# Patient Record
Sex: MALE | Race: WHITE | NOT HISPANIC OR LATINO | URBAN - METROPOLITAN AREA
[De-identification: names, ages, dates, MRNs, and addresses within clinical notes are randomized per-mention and may not be internally consistent; named-entity substitution may affect disease eponyms.]

---

## 2017-04-12 ENCOUNTER — ON CAMPUS - OUTPATIENT (AMBULATORY)
Dept: URBAN - METROPOLITAN AREA HOSPITAL 2 | Facility: HOSPITAL | Age: 62
End: 2017-04-12
Payer: COMMERCIAL

## 2017-04-12 VITALS
HEART RATE: 65 BPM | DIASTOLIC BLOOD PRESSURE: 59 MMHG | HEART RATE: 60 BPM | SYSTOLIC BLOOD PRESSURE: 153 MMHG | SYSTOLIC BLOOD PRESSURE: 130 MMHG | OXYGEN SATURATION: 93 % | DIASTOLIC BLOOD PRESSURE: 70 MMHG | DIASTOLIC BLOOD PRESSURE: 73 MMHG | TEMPERATURE: 98 F | WEIGHT: 173 LBS | OXYGEN SATURATION: 96 % | SYSTOLIC BLOOD PRESSURE: 150 MMHG | RESPIRATION RATE: 24 BRPM | DIASTOLIC BLOOD PRESSURE: 98 MMHG | OXYGEN SATURATION: 98 % | OXYGEN SATURATION: 99 % | HEART RATE: 62 BPM | HEART RATE: 73 BPM | DIASTOLIC BLOOD PRESSURE: 58 MMHG | RESPIRATION RATE: 18 BRPM | SYSTOLIC BLOOD PRESSURE: 106 MMHG | SYSTOLIC BLOOD PRESSURE: 115 MMHG | DIASTOLIC BLOOD PRESSURE: 78 MMHG | OXYGEN SATURATION: 100 % | HEIGHT: 63 IN | SYSTOLIC BLOOD PRESSURE: 123 MMHG | HEART RATE: 81 BPM

## 2017-04-12 DIAGNOSIS — K44.9 DIAPHRAGMATIC HERNIA WITHOUT OBSTRUCTION OR GANGRENE: ICD-10-CM

## 2017-04-12 DIAGNOSIS — R13.10 DYSPHAGIA, UNSPECIFIED: ICD-10-CM

## 2017-04-12 DIAGNOSIS — K21.9 GASTRO-ESOPHAGEAL REFLUX DISEASE WITHOUT ESOPHAGITIS: ICD-10-CM

## 2017-04-12 PROCEDURE — 43235 EGD DIAGNOSTIC BRUSH WASH: CPT | Performed by: INTERNAL MEDICINE

## 2017-04-12 PROCEDURE — 43450 DILATE ESOPHAGUS 1/MULT PASS: CPT | Performed by: INTERNAL MEDICINE

## 2017-04-12 RX ORDER — OMEPRAZOLE 40 MG/1
40 CAPSULE, DELAYED RELEASE ORAL
Qty: 90 | Refills: 5 | Status: COMPLETED
End: 2017-07-21

## 2017-04-12 RX ORDER — MESALAMINE 1.2 G/1
TABLET, DELAYED RELEASE ORAL
Qty: 360 | Refills: 11 | Status: COMPLETED
Start: 2017-04-12 | End: 2017-07-21

## 2017-04-12 RX ORDER — METRONIDAZOLE 500 MG/1
TABLET, FILM COATED ORAL
Qty: 40 | Refills: 5 | Status: COMPLETED
Start: 2017-04-12 | End: 2017-07-21

## 2017-04-12 RX ORDER — PREDNISONE 10 MG/1
TABLET ORAL
Qty: 105 | Refills: 1 | Status: COMPLETED
Start: 2017-04-12 | End: 2017-07-21

## 2017-04-12 RX ADMIN — PROPOFOL: 10 INJECTION, EMULSION INTRAVENOUS at 09:46

## 2017-06-01 ENCOUNTER — OFFICE (AMBULATORY)
Dept: URBAN - METROPOLITAN AREA CLINIC 64 | Facility: CLINIC | Age: 62
End: 2017-06-01

## 2017-06-01 VITALS
WEIGHT: 170 LBS | DIASTOLIC BLOOD PRESSURE: 74 MMHG | HEIGHT: 63 IN | SYSTOLIC BLOOD PRESSURE: 115 MMHG | HEART RATE: 95 BPM

## 2017-06-01 DIAGNOSIS — K51.019 ULCERATIVE (CHRONIC) PANCOLITIS WITH UNSPECIFIED COMPLICATIO: ICD-10-CM

## 2017-06-01 DIAGNOSIS — K52.9 NONINFECTIVE GASTROENTERITIS AND COLITIS, UNSPECIFIED: ICD-10-CM

## 2017-06-01 DIAGNOSIS — K59.00 CONSTIPATION, UNSPECIFIED: ICD-10-CM

## 2017-06-01 DIAGNOSIS — R10.32 LEFT LOWER QUADRANT PAIN: ICD-10-CM

## 2017-06-01 DIAGNOSIS — Z86.010 PERSONAL HISTORY OF COLONIC POLYPS: ICD-10-CM

## 2017-06-01 DIAGNOSIS — R14.0 ABDOMINAL DISTENSION (GASEOUS): ICD-10-CM

## 2017-06-01 DIAGNOSIS — K44.9 DIAPHRAGMATIC HERNIA WITHOUT OBSTRUCTION OR GANGRENE: ICD-10-CM

## 2017-06-01 PROCEDURE — 99214 OFFICE O/P EST MOD 30 MIN: CPT | Performed by: INTERNAL MEDICINE

## 2017-06-01 RX ORDER — ADALIMUMAB 40MG/0.8ML
KIT SUBCUTANEOUS
Qty: 1 | Refills: 5 | Status: COMPLETED
Start: 2017-06-01 | End: 2017-07-21

## 2017-06-01 RX ORDER — METRONIDAZOLE 500 MG/1
TABLET, FILM COATED ORAL
Qty: 40 | Refills: 5 | Status: COMPLETED
Start: 2017-04-12 | End: 2017-07-21

## 2017-06-01 RX ORDER — PREDNISONE 10 MG/1
TABLET ORAL
Qty: 105 | Refills: 1 | Status: COMPLETED
Start: 2017-04-12 | End: 2017-07-21

## 2017-06-01 RX ORDER — OMEPRAZOLE 40 MG/1
40 CAPSULE, DELAYED RELEASE ORAL
Qty: 90 | Refills: 5 | Status: COMPLETED
End: 2017-07-21

## 2017-06-01 RX ORDER — MESALAMINE 1.2 G/1
TABLET, DELAYED RELEASE ORAL
Qty: 360 | Refills: 11 | Status: COMPLETED
Start: 2017-04-12 | End: 2017-07-21

## 2017-06-02 LAB
CBC WITH DIFFERENTIAL/PLATELET: BASO (ABSOLUTE): 0 X10E3/UL (ref 0–0.2)
CBC WITH DIFFERENTIAL/PLATELET: BASOS: 1 %
CBC WITH DIFFERENTIAL/PLATELET: EOS (ABSOLUTE): 0 X10E3/UL (ref 0–0.4)
CBC WITH DIFFERENTIAL/PLATELET: EOS: 0 %
CBC WITH DIFFERENTIAL/PLATELET: HEMATOCRIT: 43.5 % (ref 37.5–51)
CBC WITH DIFFERENTIAL/PLATELET: HEMATOLOGY COMMENTS: (no result)
CBC WITH DIFFERENTIAL/PLATELET: HEMOGLOBIN: 14.6 G/DL (ref 12.6–17.7)
CBC WITH DIFFERENTIAL/PLATELET: IMMATURE CELLS: (no result)
CBC WITH DIFFERENTIAL/PLATELET: IMMATURE GRANS (ABS): 0 X10E3/UL (ref 0–0.1)
CBC WITH DIFFERENTIAL/PLATELET: IMMATURE GRANULOCYTES: 0 %
CBC WITH DIFFERENTIAL/PLATELET: LYMPHS (ABSOLUTE): 0.5 X10E3/UL — LOW (ref 0.7–3.1)
CBC WITH DIFFERENTIAL/PLATELET: LYMPHS: 21 %
CBC WITH DIFFERENTIAL/PLATELET: MCH: 30 PG (ref 26.6–33)
CBC WITH DIFFERENTIAL/PLATELET: MCHC: 33.6 G/DL (ref 31.5–35.7)
CBC WITH DIFFERENTIAL/PLATELET: MCV: 90 FL (ref 79–97)
CBC WITH DIFFERENTIAL/PLATELET: MONOCYTES(ABSOLUTE): 0.2 X10E3/UL (ref 0.1–0.9)
CBC WITH DIFFERENTIAL/PLATELET: MONOCYTES: 9 %
CBC WITH DIFFERENTIAL/PLATELET: NEUTROPHILS (ABSOLUTE): 1.7 X10E3/UL (ref 1.4–7)
CBC WITH DIFFERENTIAL/PLATELET: NEUTROPHILS: 69 %
CBC WITH DIFFERENTIAL/PLATELET: NRBC: (no result)
CBC WITH DIFFERENTIAL/PLATELET: PLATELETS: 196 X10E3/UL (ref 150–379)
CBC WITH DIFFERENTIAL/PLATELET: RBC: 4.86 X10E6/UL (ref 4.14–5.8)
CBC WITH DIFFERENTIAL/PLATELET: RDW: 14.1 % (ref 12.3–15.4)
CBC WITH DIFFERENTIAL/PLATELET: WBC: 2.5 X10E3/UL — CRITICAL LOW (ref 3.4–10.8)
COMP. METABOLIC PANEL (14): A/G RATIO: 1.7 (ref 1.2–2.2)
COMP. METABOLIC PANEL (14): ALBUMIN, SERUM: 4.5 G/DL (ref 3.6–4.8)
COMP. METABOLIC PANEL (14): ALKALINE PHOSPHATASE, S: 85 IU/L (ref 39–117)
COMP. METABOLIC PANEL (14): ALT (SGPT): 35 IU/L (ref 0–44)
COMP. METABOLIC PANEL (14): AST (SGOT): 35 IU/L (ref 0–40)
COMP. METABOLIC PANEL (14): BILIRUBIN, TOTAL: 0.3 MG/DL (ref 0–1.2)
COMP. METABOLIC PANEL (14): BUN/CREATININE RATIO: 13 (ref 10–24)
COMP. METABOLIC PANEL (14): BUN: 15 MG/DL (ref 8–27)
COMP. METABOLIC PANEL (14): CALCIUM, SERUM: 9.7 MG/DL (ref 8.6–10.2)
COMP. METABOLIC PANEL (14): CARBON DIOXIDE, TOTAL: 22 MMOL/L (ref 18–29)
COMP. METABOLIC PANEL (14): CHLORIDE, SERUM: 94 MMOL/L — LOW (ref 96–106)
COMP. METABOLIC PANEL (14): CREATININE, SERUM: 1.12 MG/DL (ref 0.76–1.27)
COMP. METABOLIC PANEL (14): EGFR IF AFRICN AM: 80 ML/MIN/1.73 (ref 59–?)
COMP. METABOLIC PANEL (14): EGFR IF NONAFRICN AM: 70 ML/MIN/1.73 (ref 59–?)
COMP. METABOLIC PANEL (14): GLOBULIN, TOTAL: 2.6 G/DL (ref 1.5–4.5)
COMP. METABOLIC PANEL (14): GLUCOSE, SERUM: 97 MG/DL (ref 65–99)
COMP. METABOLIC PANEL (14): POTASSIUM, SERUM: 9.5 MMOL/L (ref 3.5–5.2)
COMP. METABOLIC PANEL (14): PROTEIN, TOTAL, SERUM: 7.1 G/DL (ref 6–8.5)
COMP. METABOLIC PANEL (14): SODIUM, SERUM: 132 MMOL/L — LOW (ref 134–144)
HBSAG SCREEN: NEGATIVE
HEP B CORE AB, TOT: NEGATIVE
QUANTIFERON IN TUBE: INTERPRETATION: (no result)
QUANTIFERON IN TUBE: QFT TB AG MINUS NIL VALUE: 0.03 IU/ML
QUANTIFERON IN TUBE: QUANTIFERON CRITERIA: (no result)
QUANTIFERON IN TUBE: QUANTIFERON MITOGEN VALUE: 3.34 IU/ML
QUANTIFERON IN TUBE: QUANTIFERON NIL VALUE: 0.49 IU/ML
QUANTIFERON IN TUBE: QUANTIFERON TB AG VALUE: 0.52 IU/ML
QUANTIFERON IN TUBE: QUANTIFERON TB GOLD: NEGATIVE
QUANTIFERON TB GOLD (IN TUBE): QUANTIFERON INCUBATION: (no result)

## 2017-06-07 ENCOUNTER — INPATIENT HOSPITAL (AMBULATORY)
Dept: URBAN - METROPOLITAN AREA HOSPITAL 84 | Facility: HOSPITAL | Age: 62
End: 2017-06-07
Payer: COMMERCIAL

## 2017-06-07 DIAGNOSIS — R10.9 UNSPECIFIED ABDOMINAL PAIN: ICD-10-CM

## 2017-06-07 DIAGNOSIS — K58.1 IRRITABLE BOWEL SYNDROME WITH CONSTIPATION: ICD-10-CM

## 2017-06-07 PROCEDURE — 99222 1ST HOSP IP/OBS MODERATE 55: CPT | Performed by: NURSE PRACTITIONER

## 2017-07-21 ENCOUNTER — OFFICE (AMBULATORY)
Dept: URBAN - METROPOLITAN AREA CLINIC 64 | Facility: CLINIC | Age: 62
End: 2017-07-21

## 2017-07-21 VITALS
HEIGHT: 63 IN | DIASTOLIC BLOOD PRESSURE: 68 MMHG | SYSTOLIC BLOOD PRESSURE: 131 MMHG | HEART RATE: 70 BPM | WEIGHT: 176 LBS

## 2017-07-21 DIAGNOSIS — K44.9 DIAPHRAGMATIC HERNIA WITHOUT OBSTRUCTION OR GANGRENE: ICD-10-CM

## 2017-07-21 DIAGNOSIS — Z86.010 PERSONAL HISTORY OF COLONIC POLYPS: ICD-10-CM

## 2017-07-21 DIAGNOSIS — M06.9 RHEUMATOID ARTHRITIS, UNSPECIFIED: ICD-10-CM

## 2017-07-21 DIAGNOSIS — K51.019 ULCERATIVE (CHRONIC) PANCOLITIS WITH UNSPECIFIED COMPLICATIO: ICD-10-CM

## 2017-07-21 DIAGNOSIS — K64.8 OTHER HEMORRHOIDS: ICD-10-CM

## 2017-07-21 PROCEDURE — 99213 OFFICE O/P EST LOW 20 MIN: CPT | Performed by: INTERNAL MEDICINE

## 2017-07-21 RX ORDER — ADALIMUMAB 40MG/0.8ML
KIT SUBCUTANEOUS
Qty: 12 | Refills: 5 | Status: ACTIVE
Start: 2017-07-21

## 2017-07-21 RX ORDER — MESALAMINE 1.2 G/1
TABLET, DELAYED RELEASE ORAL
Qty: 180 | Refills: 11 | Status: COMPLETED
End: 2018-12-20

## 2017-07-21 RX ORDER — HYDROCORTISONE 25 MG/G
OINTMENT TOPICAL
Qty: 30 | Refills: 6 | Status: ACTIVE
Start: 2017-07-21

## 2017-07-21 RX ORDER — TRAZODONE HYDROCHLORIDE 50 MG/1
TABLET, FILM COATED ORAL
Qty: 90 | Refills: 5 | Status: COMPLETED
End: 2020-04-14

## 2017-10-10 ENCOUNTER — OFFICE (AMBULATORY)
Dept: URBAN - METROPOLITAN AREA CLINIC 64 | Facility: CLINIC | Age: 62
End: 2017-10-10

## 2017-10-10 VITALS — DIASTOLIC BLOOD PRESSURE: 71 MMHG | SYSTOLIC BLOOD PRESSURE: 129 MMHG | HEIGHT: 63 IN | HEART RATE: 68 BPM

## 2017-10-10 DIAGNOSIS — Z86.010 PERSONAL HISTORY OF COLONIC POLYPS: ICD-10-CM

## 2017-10-10 DIAGNOSIS — M06.9 RHEUMATOID ARTHRITIS, UNSPECIFIED: ICD-10-CM

## 2017-10-10 DIAGNOSIS — K51.019 ULCERATIVE (CHRONIC) PANCOLITIS WITH UNSPECIFIED COMPLICATIO: ICD-10-CM

## 2017-10-10 DIAGNOSIS — K21.9 GASTRO-ESOPHAGEAL REFLUX DISEASE WITHOUT ESOPHAGITIS: ICD-10-CM

## 2017-10-10 PROCEDURE — 99212 OFFICE O/P EST SF 10 MIN: CPT | Performed by: INTERNAL MEDICINE

## 2017-10-10 RX ORDER — TRAZODONE HYDROCHLORIDE 50 MG/1
TABLET, FILM COATED ORAL
Qty: 90 | Refills: 5 | Status: COMPLETED
End: 2020-04-14

## 2017-10-10 RX ORDER — MESALAMINE 1.2 G/1
TABLET, DELAYED RELEASE ORAL
Qty: 180 | Refills: 11 | Status: COMPLETED
End: 2018-12-20

## 2017-10-10 RX ORDER — ADALIMUMAB 40MG/0.8ML
KIT SUBCUTANEOUS
Qty: 12 | Refills: 5 | Status: ACTIVE
Start: 2017-07-21

## 2017-10-10 RX ORDER — HYDROCORTISONE 25 MG/G
OINTMENT TOPICAL
Qty: 30 | Refills: 6 | Status: ACTIVE
Start: 2017-07-21

## 2017-11-30 ENCOUNTER — OFFICE (AMBULATORY)
Dept: URBAN - METROPOLITAN AREA CLINIC 64 | Facility: CLINIC | Age: 62
End: 2017-11-30

## 2017-11-30 VITALS
DIASTOLIC BLOOD PRESSURE: 64 MMHG | SYSTOLIC BLOOD PRESSURE: 117 MMHG | HEART RATE: 70 BPM | WEIGHT: 168 LBS | HEIGHT: 63 IN

## 2017-11-30 DIAGNOSIS — J42 UNSPECIFIED CHRONIC BRONCHITIS: ICD-10-CM

## 2017-11-30 DIAGNOSIS — K52.9 NONINFECTIVE GASTROENTERITIS AND COLITIS, UNSPECIFIED: ICD-10-CM

## 2017-11-30 DIAGNOSIS — R10.32 LEFT LOWER QUADRANT PAIN: ICD-10-CM

## 2017-11-30 DIAGNOSIS — Z86.010 PERSONAL HISTORY OF COLONIC POLYPS: ICD-10-CM

## 2017-11-30 DIAGNOSIS — K51.019 ULCERATIVE (CHRONIC) PANCOLITIS WITH UNSPECIFIED COMPLICATIO: ICD-10-CM

## 2017-11-30 PROCEDURE — 99213 OFFICE O/P EST LOW 20 MIN: CPT | Performed by: INTERNAL MEDICINE

## 2017-11-30 RX ORDER — AZITHROMYCIN DIHYDRATE 250 MG/1
TABLET, FILM COATED ORAL
Qty: 1 | Refills: 0 | Status: ACTIVE
Start: 2017-11-30

## 2017-12-28 ENCOUNTER — ON CAMPUS - OUTPATIENT (AMBULATORY)
Dept: URBAN - METROPOLITAN AREA HOSPITAL 2 | Facility: HOSPITAL | Age: 62
End: 2017-12-28
Payer: COMMERCIAL

## 2017-12-28 VITALS
OXYGEN SATURATION: 96 % | WEIGHT: 170 LBS | DIASTOLIC BLOOD PRESSURE: 69 MMHG | DIASTOLIC BLOOD PRESSURE: 74 MMHG | SYSTOLIC BLOOD PRESSURE: 159 MMHG | OXYGEN SATURATION: 98 % | DIASTOLIC BLOOD PRESSURE: 89 MMHG | DIASTOLIC BLOOD PRESSURE: 86 MMHG | SYSTOLIC BLOOD PRESSURE: 88 MMHG | HEART RATE: 73 BPM | DIASTOLIC BLOOD PRESSURE: 63 MMHG | HEART RATE: 74 BPM | HEIGHT: 63 IN | HEART RATE: 75 BPM | HEART RATE: 72 BPM | OXYGEN SATURATION: 94 % | SYSTOLIC BLOOD PRESSURE: 139 MMHG | TEMPERATURE: 97.5 F | SYSTOLIC BLOOD PRESSURE: 156 MMHG | OXYGEN SATURATION: 99 % | RESPIRATION RATE: 16 BRPM | RESPIRATION RATE: 15 BRPM | HEART RATE: 65 BPM | HEART RATE: 82 BPM | SYSTOLIC BLOOD PRESSURE: 154 MMHG | SYSTOLIC BLOOD PRESSURE: 116 MMHG | DIASTOLIC BLOOD PRESSURE: 77 MMHG | OXYGEN SATURATION: 100 % | SYSTOLIC BLOOD PRESSURE: 152 MMHG | RESPIRATION RATE: 18 BRPM

## 2017-12-28 DIAGNOSIS — K57.30 DIVERTICULOSIS OF LARGE INTESTINE WITHOUT PERFORATION OR ABS: ICD-10-CM

## 2017-12-28 DIAGNOSIS — K64.8 OTHER HEMORRHOIDS: ICD-10-CM

## 2017-12-28 DIAGNOSIS — K51.019 ULCERATIVE (CHRONIC) PANCOLITIS WITH UNSPECIFIED COMPLICATIO: ICD-10-CM

## 2017-12-28 DIAGNOSIS — K62.89 OTHER SPECIFIED DISEASES OF ANUS AND RECTUM: ICD-10-CM

## 2017-12-28 DIAGNOSIS — Z86.010 PERSONAL HISTORY OF COLONIC POLYPS: ICD-10-CM

## 2017-12-28 DIAGNOSIS — R10.32 LEFT LOWER QUADRANT PAIN: ICD-10-CM

## 2017-12-28 PROCEDURE — 45330 DIAGNOSTIC SIGMOIDOSCOPY: CPT | Performed by: INTERNAL MEDICINE

## 2017-12-28 RX ORDER — TRAZODONE HYDROCHLORIDE 50 MG/1
TABLET, FILM COATED ORAL
Qty: 90 | Refills: 5 | Status: COMPLETED
End: 2020-04-14

## 2017-12-28 RX ORDER — MESALAMINE 1.2 G/1
TABLET, DELAYED RELEASE ORAL
Qty: 180 | Refills: 11 | Status: COMPLETED
End: 2018-12-20

## 2017-12-28 RX ORDER — ADALIMUMAB 40MG/0.8ML
KIT SUBCUTANEOUS
Qty: 12 | Refills: 5 | Status: ACTIVE
Start: 2017-07-21

## 2017-12-28 RX ADMIN — PROPOFOL: 10 INJECTION, EMULSION INTRAVENOUS at 08:33

## 2018-04-13 ENCOUNTER — ON CAMPUS - OUTPATIENT (AMBULATORY)
Dept: URBAN - METROPOLITAN AREA HOSPITAL 2 | Facility: HOSPITAL | Age: 63
End: 2018-04-13
Payer: COMMERCIAL

## 2018-04-13 VITALS
OXYGEN SATURATION: 97 % | OXYGEN SATURATION: 99 % | WEIGHT: 170 LBS | SYSTOLIC BLOOD PRESSURE: 97 MMHG | HEART RATE: 67 BPM | SYSTOLIC BLOOD PRESSURE: 131 MMHG | HEART RATE: 80 BPM | HEIGHT: 63 IN | HEART RATE: 82 BPM | DIASTOLIC BLOOD PRESSURE: 80 MMHG | RESPIRATION RATE: 16 BRPM | RESPIRATION RATE: 20 BRPM | RESPIRATION RATE: 19 BRPM | SYSTOLIC BLOOD PRESSURE: 123 MMHG | TEMPERATURE: 98.6 F | HEART RATE: 77 BPM | OXYGEN SATURATION: 96 % | DIASTOLIC BLOOD PRESSURE: 70 MMHG | OXYGEN SATURATION: 95 % | SYSTOLIC BLOOD PRESSURE: 124 MMHG | DIASTOLIC BLOOD PRESSURE: 52 MMHG | DIASTOLIC BLOOD PRESSURE: 45 MMHG | RESPIRATION RATE: 18 BRPM

## 2018-04-13 DIAGNOSIS — K21.9 GASTRO-ESOPHAGEAL REFLUX DISEASE WITHOUT ESOPHAGITIS: ICD-10-CM

## 2018-04-13 DIAGNOSIS — K44.9 DIAPHRAGMATIC HERNIA WITHOUT OBSTRUCTION OR GANGRENE: ICD-10-CM

## 2018-04-13 DIAGNOSIS — R13.19 OTHER DYSPHAGIA: ICD-10-CM

## 2018-04-13 PROCEDURE — 43450 DILATE ESOPHAGUS 1/MULT PASS: CPT | Performed by: INTERNAL MEDICINE

## 2018-04-13 PROCEDURE — 43235 EGD DIAGNOSTIC BRUSH WASH: CPT | Performed by: INTERNAL MEDICINE

## 2018-04-13 RX ORDER — OMEPRAZOLE 40 MG/1
CAPSULE, DELAYED RELEASE ORAL
Qty: 90 | Refills: 6 | Status: COMPLETED
End: 2018-04-15

## 2018-04-13 RX ADMIN — PROPOFOL: 10 INJECTION, EMULSION INTRAVENOUS at 13:25

## 2018-05-11 ENCOUNTER — OFFICE (AMBULATORY)
Dept: URBAN - METROPOLITAN AREA CLINIC 64 | Facility: CLINIC | Age: 63
End: 2018-05-11

## 2018-05-11 VITALS
WEIGHT: 171 LBS | DIASTOLIC BLOOD PRESSURE: 81 MMHG | HEART RATE: 62 BPM | HEIGHT: 63 IN | SYSTOLIC BLOOD PRESSURE: 133 MMHG

## 2018-05-11 DIAGNOSIS — K51.019 ULCERATIVE (CHRONIC) PANCOLITIS WITH UNSPECIFIED COMPLICATIO: ICD-10-CM

## 2018-05-11 DIAGNOSIS — R13.19 OTHER DYSPHAGIA: ICD-10-CM

## 2018-05-11 DIAGNOSIS — K64.8 OTHER HEMORRHOIDS: ICD-10-CM

## 2018-05-11 DIAGNOSIS — Z86.010 PERSONAL HISTORY OF COLONIC POLYPS: ICD-10-CM

## 2018-05-11 PROCEDURE — 99213 OFFICE O/P EST LOW 20 MIN: CPT | Performed by: INTERNAL MEDICINE

## 2018-05-11 RX ORDER — HYDROCORTISONE 25 MG/G
OINTMENT TOPICAL
Qty: 30 | Refills: 6 | Status: COMPLETED
Start: 2017-12-28 | End: 2018-11-02

## 2018-05-11 RX ORDER — MESALAMINE 1.2 G/1
TABLET, DELAYED RELEASE ORAL
Qty: 180 | Refills: 11 | Status: COMPLETED
End: 2018-12-20

## 2018-05-11 RX ORDER — OMEPRAZOLE 20 MG/1
CAPSULE, DELAYED RELEASE ORAL
Qty: 180 | Refills: 3 | Status: ACTIVE
Start: 2018-04-15

## 2018-05-11 RX ORDER — TRAZODONE HYDROCHLORIDE 50 MG/1
TABLET, FILM COATED ORAL
Qty: 90 | Refills: 5 | Status: COMPLETED
End: 2020-04-14

## 2018-05-11 RX ORDER — ADALIMUMAB 40MG/0.8ML
KIT SUBCUTANEOUS
Qty: 12 | Refills: 5 | Status: ACTIVE
Start: 2017-07-21

## 2018-05-11 RX ORDER — CELECOXIB 100 MG/1
CAPSULE ORAL
Qty: 180 | Refills: 10 | Status: COMPLETED
Start: 2017-12-28 | End: 2022-10-11

## 2018-11-02 ENCOUNTER — OFFICE (AMBULATORY)
Dept: URBAN - METROPOLITAN AREA CLINIC 64 | Facility: CLINIC | Age: 63
End: 2018-11-02

## 2018-11-02 VITALS
SYSTOLIC BLOOD PRESSURE: 120 MMHG | HEIGHT: 63 IN | DIASTOLIC BLOOD PRESSURE: 83 MMHG | HEART RATE: 91 BPM | WEIGHT: 179 LBS

## 2018-11-02 DIAGNOSIS — K64.8 OTHER HEMORRHOIDS: ICD-10-CM

## 2018-11-02 DIAGNOSIS — R10.32 LEFT LOWER QUADRANT PAIN: ICD-10-CM

## 2018-11-02 DIAGNOSIS — K51.019 ULCERATIVE (CHRONIC) PANCOLITIS WITH UNSPECIFIED COMPLICATIO: ICD-10-CM

## 2018-11-02 DIAGNOSIS — R13.19 OTHER DYSPHAGIA: ICD-10-CM

## 2018-11-02 DIAGNOSIS — Z86.010 PERSONAL HISTORY OF COLONIC POLYPS: ICD-10-CM

## 2018-11-02 DIAGNOSIS — K44.9 DIAPHRAGMATIC HERNIA WITHOUT OBSTRUCTION OR GANGRENE: ICD-10-CM

## 2018-11-02 PROCEDURE — 99213 OFFICE O/P EST LOW 20 MIN: CPT | Performed by: INTERNAL MEDICINE

## 2018-11-02 RX ORDER — MESALAMINE 1.2 G/1
TABLET, DELAYED RELEASE ORAL
Qty: 180 | Refills: 11 | Status: COMPLETED
End: 2018-12-20

## 2018-11-02 RX ORDER — TRAZODONE HYDROCHLORIDE 50 MG/1
TABLET, FILM COATED ORAL
Qty: 90 | Refills: 5 | Status: COMPLETED
End: 2020-04-14

## 2018-11-02 RX ORDER — OMEPRAZOLE 20 MG/1
CAPSULE, DELAYED RELEASE ORAL
Qty: 180 | Refills: 3 | Status: ACTIVE
Start: 2018-04-15

## 2018-11-02 RX ORDER — ADALIMUMAB 40MG/0.4ML
KIT SUBCUTANEOUS
Qty: 6 | Refills: 5 | Status: ACTIVE
Start: 2018-11-02

## 2018-11-02 RX ORDER — CELECOXIB 100 MG/1
CAPSULE ORAL
Qty: 180 | Refills: 10 | Status: COMPLETED
Start: 2017-12-28 | End: 2022-10-11

## 2018-12-20 ENCOUNTER — ON CAMPUS - OUTPATIENT (AMBULATORY)
Dept: URBAN - METROPOLITAN AREA HOSPITAL 2 | Facility: HOSPITAL | Age: 63
End: 2018-12-20
Payer: COMMERCIAL

## 2018-12-20 VITALS
OXYGEN SATURATION: 100 % | HEART RATE: 91 BPM | HEART RATE: 77 BPM | HEART RATE: 84 BPM | SYSTOLIC BLOOD PRESSURE: 135 MMHG | TEMPERATURE: 97.8 F | OXYGEN SATURATION: 97 % | DIASTOLIC BLOOD PRESSURE: 44 MMHG | RESPIRATION RATE: 18 BRPM | DIASTOLIC BLOOD PRESSURE: 96 MMHG | OXYGEN SATURATION: 96 % | SYSTOLIC BLOOD PRESSURE: 93 MMHG | DIASTOLIC BLOOD PRESSURE: 89 MMHG | RESPIRATION RATE: 16 BRPM | SYSTOLIC BLOOD PRESSURE: 147 MMHG | HEIGHT: 63 IN | SYSTOLIC BLOOD PRESSURE: 118 MMHG | WEIGHT: 170 LBS | DIASTOLIC BLOOD PRESSURE: 81 MMHG | HEART RATE: 78 BPM

## 2018-12-20 DIAGNOSIS — R13.10 DYSPHAGIA, UNSPECIFIED: ICD-10-CM

## 2018-12-20 DIAGNOSIS — R07.89 OTHER CHEST PAIN: ICD-10-CM

## 2018-12-20 DIAGNOSIS — K44.9 DIAPHRAGMATIC HERNIA WITHOUT OBSTRUCTION OR GANGRENE: ICD-10-CM

## 2018-12-20 PROCEDURE — 43450 DILATE ESOPHAGUS 1/MULT PASS: CPT | Performed by: INTERNAL MEDICINE

## 2018-12-20 PROCEDURE — 43235 EGD DIAGNOSTIC BRUSH WASH: CPT | Performed by: INTERNAL MEDICINE

## 2018-12-20 RX ORDER — CELECOXIB 100 MG/1
CAPSULE ORAL
Qty: 180 | Refills: 10 | Status: COMPLETED
Start: 2017-12-28 | End: 2022-10-11

## 2018-12-20 RX ORDER — ADALIMUMAB 40MG/0.4ML
KIT SUBCUTANEOUS
Qty: 6 | Refills: 5 | Status: COMPLETED
Start: 2018-12-20 | End: 2020-03-31

## 2018-12-20 RX ORDER — MESALAMINE 1.2 G/1
TABLET, DELAYED RELEASE ORAL
Qty: 180 | Refills: 11 | Status: COMPLETED
End: 2018-12-20

## 2018-12-20 RX ORDER — OMEPRAZOLE 20 MG/1
CAPSULE, DELAYED RELEASE ORAL
Qty: 180 | Refills: 3 | Status: ACTIVE
Start: 2018-04-15

## 2018-12-20 RX ORDER — TRAZODONE HYDROCHLORIDE 50 MG/1
TABLET, FILM COATED ORAL
Qty: 90 | Refills: 5 | Status: COMPLETED
End: 2020-04-14

## 2019-11-26 ENCOUNTER — ON CAMPUS - OUTPATIENT (AMBULATORY)
Dept: URBAN - METROPOLITAN AREA HOSPITAL 2 | Facility: HOSPITAL | Age: 64
End: 2019-11-26
Payer: COMMERCIAL

## 2019-11-26 VITALS
SYSTOLIC BLOOD PRESSURE: 133 MMHG | HEART RATE: 89 BPM | DIASTOLIC BLOOD PRESSURE: 82 MMHG | OXYGEN SATURATION: 100 % | SYSTOLIC BLOOD PRESSURE: 167 MMHG | DIASTOLIC BLOOD PRESSURE: 70 MMHG | TEMPERATURE: 97.6 F | HEART RATE: 80 BPM | HEIGHT: 63 IN | OXYGEN SATURATION: 97 % | OXYGEN SATURATION: 96 % | HEART RATE: 93 BPM | SYSTOLIC BLOOD PRESSURE: 119 MMHG | DIASTOLIC BLOOD PRESSURE: 79 MMHG | SYSTOLIC BLOOD PRESSURE: 147 MMHG | HEART RATE: 71 BPM | WEIGHT: 176 LBS | RESPIRATION RATE: 16 BRPM | DIASTOLIC BLOOD PRESSURE: 91 MMHG | SYSTOLIC BLOOD PRESSURE: 144 MMHG | HEART RATE: 92 BPM

## 2019-11-26 DIAGNOSIS — K21.9 GASTRO-ESOPHAGEAL REFLUX DISEASE WITHOUT ESOPHAGITIS: ICD-10-CM

## 2019-11-26 DIAGNOSIS — R07.89 OTHER CHEST PAIN: ICD-10-CM

## 2019-11-26 DIAGNOSIS — K44.9 DIAPHRAGMATIC HERNIA WITHOUT OBSTRUCTION OR GANGRENE: ICD-10-CM

## 2019-11-26 DIAGNOSIS — R13.19 OTHER DYSPHAGIA: ICD-10-CM

## 2019-11-26 PROCEDURE — 43235 EGD DIAGNOSTIC BRUSH WASH: CPT | Performed by: INTERNAL MEDICINE

## 2019-11-26 PROCEDURE — 43450 DILATE ESOPHAGUS 1/MULT PASS: CPT | Performed by: INTERNAL MEDICINE

## 2019-11-26 RX ORDER — CELECOXIB 100 MG/1
CAPSULE ORAL
Qty: 180 | Refills: 10 | Status: COMPLETED
Start: 2017-12-28 | End: 2022-10-11

## 2019-11-26 RX ORDER — ADALIMUMAB 40MG/0.4ML
KIT SUBCUTANEOUS
Qty: 6 | Refills: 5 | Status: COMPLETED
Start: 2018-12-20 | End: 2020-03-31

## 2019-11-26 RX ORDER — HYDROCORTISONE 25 MG/G
OINTMENT TOPICAL
Qty: 30 | Refills: 6 | Status: COMPLETED
Start: 2019-10-04 | End: 2020-04-14

## 2019-11-26 RX ORDER — OMEPRAZOLE 20 MG/1
CAPSULE, DELAYED RELEASE ORAL
Qty: 180 | Refills: 3 | Status: ACTIVE
Start: 2018-04-15

## 2020-06-11 ENCOUNTER — ON CAMPUS - OUTPATIENT (AMBULATORY)
Dept: URBAN - METROPOLITAN AREA HOSPITAL 2 | Facility: HOSPITAL | Age: 65
End: 2020-06-11

## 2020-06-11 ENCOUNTER — OFFICE (AMBULATORY)
Dept: URBAN - METROPOLITAN AREA PATHOLOGY 4 | Facility: PATHOLOGY | Age: 65
End: 2020-06-11
Payer: COMMERCIAL

## 2020-06-11 ENCOUNTER — OFFICE (AMBULATORY)
Dept: URBAN - METROPOLITAN AREA PATHOLOGY 4 | Facility: PATHOLOGY | Age: 65
End: 2020-06-11

## 2020-06-11 VITALS
HEIGHT: 63 IN | SYSTOLIC BLOOD PRESSURE: 131 MMHG | DIASTOLIC BLOOD PRESSURE: 86 MMHG | RESPIRATION RATE: 20 BRPM | OXYGEN SATURATION: 98 % | SYSTOLIC BLOOD PRESSURE: 153 MMHG | DIASTOLIC BLOOD PRESSURE: 46 MMHG | DIASTOLIC BLOOD PRESSURE: 84 MMHG | DIASTOLIC BLOOD PRESSURE: 75 MMHG | HEART RATE: 80 BPM | HEART RATE: 76 BPM | DIASTOLIC BLOOD PRESSURE: 80 MMHG | OXYGEN SATURATION: 100 % | HEART RATE: 75 BPM | SYSTOLIC BLOOD PRESSURE: 150 MMHG | HEART RATE: 90 BPM | SYSTOLIC BLOOD PRESSURE: 143 MMHG | RESPIRATION RATE: 16 BRPM | SYSTOLIC BLOOD PRESSURE: 128 MMHG | HEART RATE: 82 BPM | SYSTOLIC BLOOD PRESSURE: 134 MMHG | WEIGHT: 170 LBS | HEART RATE: 74 BPM | TEMPERATURE: 97.8 F

## 2020-06-11 DIAGNOSIS — Z86.010 PERSONAL HISTORY OF COLONIC POLYPS: ICD-10-CM

## 2020-06-11 DIAGNOSIS — K62.1 RECTAL POLYP: ICD-10-CM

## 2020-06-11 DIAGNOSIS — K64.8 OTHER HEMORRHOIDS: ICD-10-CM

## 2020-06-11 DIAGNOSIS — K57.30 DIVERTICULOSIS OF LARGE INTESTINE WITHOUT PERFORATION OR ABS: ICD-10-CM

## 2020-06-11 DIAGNOSIS — K21.9 GASTRO-ESOPHAGEAL REFLUX DISEASE WITHOUT ESOPHAGITIS: ICD-10-CM

## 2020-06-11 DIAGNOSIS — R13.19 OTHER DYSPHAGIA: ICD-10-CM

## 2020-06-11 DIAGNOSIS — K44.9 DIAPHRAGMATIC HERNIA WITHOUT OBSTRUCTION OR GANGRENE: ICD-10-CM

## 2020-06-11 PROBLEM — K63.5 POLYP OF COLON: Status: ACTIVE | Noted: 2020-06-11

## 2020-06-11 LAB
GI HISTOLOGY: A. UNSPECIFIED: (no result)
GI HISTOLOGY: PDF REPORT: (no result)

## 2020-06-11 PROCEDURE — 45380 COLONOSCOPY AND BIOPSY: CPT | Mod: PT | Performed by: INTERNAL MEDICINE

## 2020-06-11 PROCEDURE — 88305 TISSUE EXAM BY PATHOLOGIST: CPT | Mod: 26 | Performed by: INTERNAL MEDICINE

## 2020-06-11 PROCEDURE — 43450 DILATE ESOPHAGUS 1/MULT PASS: CPT | Performed by: INTERNAL MEDICINE

## 2020-06-11 PROCEDURE — 43235 EGD DIAGNOSTIC BRUSH WASH: CPT | Performed by: INTERNAL MEDICINE

## 2020-06-11 RX ORDER — OMEPRAZOLE 20 MG/1
CAPSULE, DELAYED RELEASE ORAL
Qty: 180 | Refills: 3 | Status: ACTIVE
Start: 2018-04-15

## 2020-09-01 ENCOUNTER — OFFICE (AMBULATORY)
Dept: URBAN - METROPOLITAN AREA CLINIC 64 | Facility: CLINIC | Age: 65
End: 2020-09-01

## 2020-09-01 VITALS
SYSTOLIC BLOOD PRESSURE: 135 MMHG | HEIGHT: 63 IN | DIASTOLIC BLOOD PRESSURE: 75 MMHG | HEART RATE: 85 BPM | WEIGHT: 174 LBS

## 2020-09-01 DIAGNOSIS — R07.89 OTHER CHEST PAIN: ICD-10-CM

## 2020-09-01 DIAGNOSIS — R10.32 LEFT LOWER QUADRANT PAIN: ICD-10-CM

## 2020-09-01 DIAGNOSIS — R05 COUGH: ICD-10-CM

## 2020-09-01 DIAGNOSIS — M06.9 RHEUMATOID ARTHRITIS, UNSPECIFIED: ICD-10-CM

## 2020-09-01 DIAGNOSIS — J42 UNSPECIFIED CHRONIC BRONCHITIS: ICD-10-CM

## 2020-09-01 DIAGNOSIS — Z86.010 PERSONAL HISTORY OF COLONIC POLYPS: ICD-10-CM

## 2020-09-01 DIAGNOSIS — K52.9 NONINFECTIVE GASTROENTERITIS AND COLITIS, UNSPECIFIED: ICD-10-CM

## 2020-09-01 PROCEDURE — 99213 OFFICE O/P EST LOW 20 MIN: CPT | Performed by: INTERNAL MEDICINE

## 2020-09-01 RX ORDER — CELECOXIB 100 MG/1
CAPSULE ORAL
Qty: 180 | Refills: 10 | Status: COMPLETED
Start: 2017-12-28 | End: 2022-10-11

## 2020-09-01 RX ORDER — LOSARTAN POTASSIUM 50 MG/1
TABLET, FILM COATED ORAL
Refills: 0 | Status: COMPLETED
End: 2020-09-01

## 2020-09-01 RX ORDER — ADALIMUMAB 40MG/0.4ML
KIT SUBCUTANEOUS
Qty: 3 | Refills: 1 | Status: COMPLETED
Start: 2020-03-31 | End: 2022-10-11

## 2020-09-01 RX ORDER — OMEPRAZOLE 20 MG/1
40 CAPSULE, DELAYED RELEASE ORAL
Qty: 180 | Refills: 11 | Status: ACTIVE
Start: 2020-09-01

## 2020-09-01 RX ORDER — METOPROLOL SUCCINATE 100 MG/1
TABLET, EXTENDED RELEASE ORAL
Qty: 90 | Refills: 5 | Status: ACTIVE
Start: 2020-09-01

## 2020-09-01 RX ORDER — METOPROLOL SUCCINATE 100 MG/1
TABLET, EXTENDED RELEASE ORAL
Qty: 90 | Refills: 5 | Status: COMPLETED
Start: 2020-09-01 | End: 2022-04-20

## 2020-10-14 ENCOUNTER — LAB (OUTPATIENT)
Dept: LAB | Facility: HOSPITAL | Age: 65
End: 2020-10-14

## 2020-10-14 ENCOUNTER — TRANSCRIBE ORDERS (OUTPATIENT)
Dept: ADMINISTRATIVE | Facility: HOSPITAL | Age: 65
End: 2020-10-14

## 2020-10-14 DIAGNOSIS — Z01.818 PRE-OP TESTING: ICD-10-CM

## 2020-10-14 DIAGNOSIS — R05.9 COUGH: ICD-10-CM

## 2020-10-14 DIAGNOSIS — R05.9 COUGH: Primary | ICD-10-CM

## 2020-10-14 LAB
APTT PPP: 25.1 SECONDS (ref 24–31)
BASOPHILS # BLD AUTO: 0.03 10*3/MM3 (ref 0–0.2)
BASOPHILS NFR BLD AUTO: 0.5 % (ref 0–1.5)
DEPRECATED RDW RBC AUTO: 40.2 FL (ref 37–54)
EOSINOPHIL # BLD AUTO: 0.12 10*3/MM3 (ref 0–0.4)
EOSINOPHIL NFR BLD AUTO: 1.9 % (ref 0.3–6.2)
ERYTHROCYTE [DISTWIDTH] IN BLOOD BY AUTOMATED COUNT: 13 % (ref 12.3–15.4)
HCT VFR BLD AUTO: 40.9 % (ref 37.5–51)
HGB BLD-MCNC: 14.1 G/DL (ref 13–17.7)
IMM GRANULOCYTES # BLD AUTO: 0.02 10*3/MM3 (ref 0–0.05)
IMM GRANULOCYTES NFR BLD AUTO: 0.3 % (ref 0–0.5)
INR PPP: 0.98 (ref 0.93–1.1)
LYMPHOCYTES # BLD AUTO: 2.41 10*3/MM3 (ref 0.7–3.1)
LYMPHOCYTES NFR BLD AUTO: 38.8 % (ref 19.6–45.3)
MCH RBC QN AUTO: 29.6 PG (ref 26.6–33)
MCHC RBC AUTO-ENTMCNC: 34.5 G/DL (ref 31.5–35.7)
MCV RBC AUTO: 85.9 FL (ref 79–97)
MONOCYTES # BLD AUTO: 0.67 10*3/MM3 (ref 0.1–0.9)
MONOCYTES NFR BLD AUTO: 10.8 % (ref 5–12)
NEUTROPHILS NFR BLD AUTO: 2.96 10*3/MM3 (ref 1.7–7)
NEUTROPHILS NFR BLD AUTO: 47.7 % (ref 42.7–76)
NRBC BLD AUTO-RTO: 0 /100 WBC (ref 0–0.2)
PLATELET # BLD AUTO: 239 10*3/MM3 (ref 140–450)
PMV BLD AUTO: 9.6 FL (ref 6–12)
PROTHROMBIN TIME: 10.8 SECONDS (ref 9.6–11.7)
RBC # BLD AUTO: 4.76 10*6/MM3 (ref 4.14–5.8)
WBC # BLD AUTO: 6.21 10*3/MM3 (ref 3.4–10.8)

## 2020-10-14 PROCEDURE — C9803 HOPD COVID-19 SPEC COLLECT: HCPCS

## 2020-10-14 PROCEDURE — 85025 COMPLETE CBC W/AUTO DIFF WBC: CPT

## 2020-10-14 PROCEDURE — U0004 COV-19 TEST NON-CDC HGH THRU: HCPCS

## 2020-10-14 PROCEDURE — 36415 COLL VENOUS BLD VENIPUNCTURE: CPT

## 2020-10-14 PROCEDURE — 85610 PROTHROMBIN TIME: CPT

## 2020-10-14 PROCEDURE — 85730 THROMBOPLASTIN TIME PARTIAL: CPT

## 2020-10-14 RX ORDER — CELECOXIB 100 MG/1
100 CAPSULE ORAL 2 TIMES DAILY
COMMUNITY

## 2020-10-14 RX ORDER — TRAZODONE HYDROCHLORIDE 50 MG/1
50 TABLET ORAL NIGHTLY
COMMUNITY

## 2020-10-14 RX ORDER — OMEPRAZOLE 20 MG/1
40 CAPSULE, DELAYED RELEASE ORAL DAILY
COMMUNITY

## 2020-10-14 RX ORDER — ATORVASTATIN CALCIUM 20 MG/1
20 TABLET, FILM COATED ORAL NIGHTLY
COMMUNITY

## 2020-10-14 RX ORDER — MONTELUKAST SODIUM 10 MG/1
10 TABLET ORAL NIGHTLY
COMMUNITY

## 2020-10-14 RX ORDER — CYCLOBENZAPRINE HCL 10 MG
10 TABLET ORAL 2 TIMES DAILY PRN
COMMUNITY

## 2020-10-14 RX ORDER — TAMSULOSIN HYDROCHLORIDE 0.4 MG/1
2 CAPSULE ORAL
COMMUNITY

## 2020-10-14 RX ORDER — METOPROLOL SUCCINATE 100 MG/1
100 TABLET, EXTENDED RELEASE ORAL DAILY
COMMUNITY

## 2020-10-15 ENCOUNTER — ANESTHESIA EVENT (OUTPATIENT)
Dept: GASTROENTEROLOGY | Facility: HOSPITAL | Age: 65
End: 2020-10-15

## 2020-10-15 LAB — SARS-COV-2 RNA RESP QL NAA+PROBE: NOT DETECTED

## 2020-10-15 RX ORDER — SODIUM CHLORIDE 9 MG/ML
9 INJECTION, SOLUTION INTRAVENOUS CONTINUOUS PRN
Status: CANCELLED | OUTPATIENT
Start: 2020-10-15

## 2020-10-15 RX ORDER — SODIUM CHLORIDE 0.9 % (FLUSH) 0.9 %
10 SYRINGE (ML) INJECTION EVERY 12 HOURS SCHEDULED
Status: CANCELLED | OUTPATIENT
Start: 2020-10-15

## 2020-10-15 RX ORDER — SODIUM CHLORIDE 0.9 % (FLUSH) 0.9 %
10 SYRINGE (ML) INJECTION AS NEEDED
Status: CANCELLED | OUTPATIENT
Start: 2020-10-15

## 2020-10-16 ENCOUNTER — HOSPITAL ENCOUNTER (OUTPATIENT)
Facility: HOSPITAL | Age: 65
Setting detail: HOSPITAL OUTPATIENT SURGERY
Discharge: HOME | End: 2020-10-16
Attending: INTERNAL MEDICINE | Admitting: INTERNAL MEDICINE

## 2020-10-16 ENCOUNTER — ANESTHESIA (OUTPATIENT)
Dept: GASTROENTEROLOGY | Facility: HOSPITAL | Age: 65
End: 2020-10-16

## 2020-10-16 VITALS
HEIGHT: 63 IN | WEIGHT: 185.19 LBS | OXYGEN SATURATION: 97 % | DIASTOLIC BLOOD PRESSURE: 60 MMHG | TEMPERATURE: 97.7 F | RESPIRATION RATE: 16 BRPM | BODY MASS INDEX: 32.81 KG/M2 | HEART RATE: 74 BPM | SYSTOLIC BLOOD PRESSURE: 148 MMHG

## 2020-10-16 DIAGNOSIS — R05.3 CHRONIC COUGH: ICD-10-CM

## 2020-10-16 LAB

## 2020-10-16 PROCEDURE — 0100U HC BIOFIRE FILMARRAY RESP PANEL 2: CPT | Performed by: INTERNAL MEDICINE

## 2020-10-16 PROCEDURE — 87205 SMEAR GRAM STAIN: CPT | Performed by: INTERNAL MEDICINE

## 2020-10-16 PROCEDURE — 87798 DETECT AGENT NOS DNA AMP: CPT | Performed by: INTERNAL MEDICINE

## 2020-10-16 PROCEDURE — 87102 FUNGUS ISOLATION CULTURE: CPT | Performed by: INTERNAL MEDICINE

## 2020-10-16 PROCEDURE — 87116 MYCOBACTERIA CULTURE: CPT | Performed by: INTERNAL MEDICINE

## 2020-10-16 PROCEDURE — 87070 CULTURE OTHR SPECIMN AEROBIC: CPT | Performed by: INTERNAL MEDICINE

## 2020-10-16 PROCEDURE — 88108 CYTOPATH CONCENTRATE TECH: CPT | Performed by: INTERNAL MEDICINE

## 2020-10-16 PROCEDURE — 87496 CYTOMEG DNA AMP PROBE: CPT | Performed by: INTERNAL MEDICINE

## 2020-10-16 PROCEDURE — 25010000002 PROPOFOL 10 MG/ML EMULSION: Performed by: NURSE ANESTHETIST, CERTIFIED REGISTERED

## 2020-10-16 PROCEDURE — 87206 SMEAR FLUORESCENT/ACID STAI: CPT | Performed by: INTERNAL MEDICINE

## 2020-10-16 RX ORDER — SODIUM CHLORIDE 9 MG/ML
INJECTION, SOLUTION INTRAVENOUS CONTINUOUS PRN
Status: DISCONTINUED | OUTPATIENT
Start: 2020-10-16 | End: 2020-10-16 | Stop reason: SURG

## 2020-10-16 RX ORDER — PROPOFOL 10 MG/ML
VIAL (ML) INTRAVENOUS AS NEEDED
Status: DISCONTINUED | OUTPATIENT
Start: 2020-10-16 | End: 2020-10-16 | Stop reason: SURG

## 2020-10-16 RX ORDER — LIDOCAINE HYDROCHLORIDE 20 MG/ML
INJECTION, SOLUTION INFILTRATION; PERINEURAL AS NEEDED
Status: DISCONTINUED | OUTPATIENT
Start: 2020-10-16 | End: 2020-10-16 | Stop reason: HOSPADM

## 2020-10-16 RX ORDER — LIDOCAINE HYDROCHLORIDE 20 MG/ML
INJECTION, SOLUTION INTRAVENOUS
Status: DISCONTINUED
Start: 2020-10-16 | End: 2020-10-16 | Stop reason: HOSPADM

## 2020-10-16 RX ORDER — LIDOCAINE HYDROCHLORIDE 10 MG/ML
INJECTION, SOLUTION EPIDURAL; INFILTRATION; INTRACAUDAL; PERINEURAL AS NEEDED
Status: DISCONTINUED | OUTPATIENT
Start: 2020-10-16 | End: 2020-10-16 | Stop reason: SURG

## 2020-10-16 RX ORDER — IPRATROPIUM BROMIDE AND ALBUTEROL SULFATE 2.5; .5 MG/3ML; MG/3ML
3 SOLUTION RESPIRATORY (INHALATION) ONCE
Status: COMPLETED | OUTPATIENT
Start: 2020-10-16 | End: 2020-10-16

## 2020-10-16 RX ORDER — LIDOCAINE 50 MG/G
OINTMENT TOPICAL AS NEEDED
Status: DISCONTINUED | OUTPATIENT
Start: 2020-10-16 | End: 2020-10-16 | Stop reason: HOSPADM

## 2020-10-16 RX ADMIN — SODIUM CHLORIDE: 0.9 INJECTION, SOLUTION INTRAVENOUS at 10:05

## 2020-10-16 RX ADMIN — PROPOFOL 30 MG: 10 INJECTION, EMULSION INTRAVENOUS at 10:19

## 2020-10-16 RX ADMIN — PROPOFOL 100 MG: 10 INJECTION, EMULSION INTRAVENOUS at 10:17

## 2020-10-16 RX ADMIN — LIDOCAINE HYDROCHLORIDE 100 MG: 10 INJECTION, SOLUTION EPIDURAL; INFILTRATION; INTRACAUDAL; PERINEURAL at 10:17

## 2020-10-16 RX ADMIN — PROPOFOL 40 MG: 10 INJECTION, EMULSION INTRAVENOUS at 10:18

## 2020-10-16 RX ADMIN — IPRATROPIUM BROMIDE AND ALBUTEROL SULFATE 3 ML: .5; 3 SOLUTION RESPIRATORY (INHALATION) at 10:49

## 2020-10-16 NOTE — ANESTHESIA POSTPROCEDURE EVALUATION
Patient: Roland Abbott    Procedure Summary     Date: 10/16/20 Room / Location: [facility] ENDOSCOPY 2 / [facility] ENDOSCOPY    Anesthesia Start: 1005 Anesthesia Stop: 1025    Procedure: BRONCHOSCOPY (N/A Bronchus) Diagnosis:       Chronic cough      (Chronic cough [R05])    Surgeon: Annetta Fritz MD Provider: Jerry Drake MD    Anesthesia Type: general ASA Status: 2          Anesthesia Type: general    Vitals  Vitals Value Taken Time   /97 10/16/20 1034   Temp     Pulse 75 10/16/20 1037   Resp 16 10/16/20 1029   SpO2 95 % 10/16/20 1037   Vitals shown include unvalidated device data.        Post Anesthesia Care and Evaluation    Patient location during evaluation: PACU  Patient participation: complete - patient participated  Level of consciousness: awake  Pain scale: See nurse's notes for pain score.  Pain management: adequate  Airway patency: patent  Anesthetic complications: No anesthetic complications  PONV Status: none  Cardiovascular status: acceptable  Respiratory status: acceptable  Hydration status: acceptable    Comments: Patient seen and examined postoperatively; vital signs stable; SpO2 greater than or equal to 90%; cardiopulmonary status stable; nausea/vomiting adequately controlled; pain adequately controlled; no apparent anesthesia complications; patient discharged from anesthesia care when discharge criteria were met

## 2020-10-16 NOTE — ANESTHESIA PREPROCEDURE EVALUATION
Anesthesia Evaluation     Patient summary reviewed   NPO Solid Status: > 8 hours  NPO Liquid Status: > 8 hours           Airway   Mallampati: II  TM distance: >3 FB  Neck ROM: full  No difficulty expected  Dental - normal exam     Pulmonary - normal exam   (+) sleep apnea on CPAP,   Cardiovascular - normal exam    (+) hypertension, hyperlipidemia,       Neuro/Psych  GI/Hepatic/Renal/Endo    (+)  hiatal hernia, GERD,  renal disease,     Musculoskeletal     Abdominal  - normal exam    Bowel sounds: normal.   Substance History      OB/GYN          Other                        Anesthesia Plan    ASA 2     general     intravenous induction     Anesthetic plan, all risks, benefits, and alternatives have been provided, discussed and informed consent has been obtained with: patient.

## 2020-10-16 NOTE — DISCHARGE INSTRUCTIONS
Do not drink alcohol, drive, operate any heavy machinery or power tools, or make any important/legal decisions for the next 24 hours.    Call you doctor immediately if you experience severe chest pain, shortness of breath, bleeding or coughing up blood, or fever over 101 F.    Diet: Nothing by mouth until  1230    After a bronchoscopy, you may experience a scratchy throat. This will gradually get better. You may gargle with warm salt water for this after the time noted above is over.     A responsible adult should stay with you and you should rest quietly for the rest of the day. Follow up with MD as instructed.

## 2020-10-16 NOTE — OP NOTE
Bronchoscopy Procedure Note    Procedure:  1. Bronchoscopy, Therapeutic    Pre-Operative Diagnosis: Chronic cough patient on Humira rule out opportunistic infection    Post-Operative Diagnosis:   Severe inflammatory airway disease  Severe reactive airway disease  No endobronchial lesions  Mild mucoid secretions were suctioned   Right lung washing    Anesthesia: Moderate Sedation    Procedure Details: Patient was consented for the procedure with all risk and benefit of the procedure explained in detail.  Patient was given the opportunity to ask questions and all concerns were answered.  The bronchocope was inserted into the main airway via the oropharynx. An anatomical survey was done of the main airways and the subsegmental bronchus to at least the first subsegmental level of all five lobes of both lungs.  The findings are reported below.  A bronchoalveolar lavage was performed using aliquots of normal saline instilled into the airways then aspirated back.    Findings:      Severe inflammatory airway disease  Severe reactive airway disease  No endobronchial lesions  Mild mucoid secretions were suctioned   Right lung washing      Patient tolerated procedure well        Estimated Blood Loss:  Minimal           Specimens:  Sent serosanguinous fluid                Complications:  None; patient tolerated the procedure well.           Disposition: PACU - hemodynamically stable.      Patient tolerated the procedure well.    Annetta Fritz MD  10/16/2020  10:40 EDT

## 2020-10-18 LAB
BACTERIA SPEC RESP CULT: NORMAL
GRAM STN SPEC: NORMAL

## 2020-10-19 LAB
LAB AP CASE REPORT: NORMAL
PATH REPORT.FINAL DX SPEC: NORMAL
PATH REPORT.GROSS SPEC: NORMAL

## 2020-10-21 LAB
CMV DNA SPEC QL NAA+PROBE: NEGATIVE
SPECIMEN SOURCE: NORMAL

## 2020-10-22 LAB
P JIROVECII DNA # SPEC NAA+PROBE: NEGATIVE {COPIES}/ML
SPECIMEN SOURCE: NORMAL

## 2020-11-13 LAB — FUNGUS WND CULT: NORMAL

## 2020-11-27 LAB
MYCOBACTERIUM SPEC CULT: NORMAL
NIGHT BLUE STAIN TISS: NORMAL

## 2021-04-09 ENCOUNTER — OFFICE (AMBULATORY)
Dept: URBAN - METROPOLITAN AREA CLINIC 64 | Facility: CLINIC | Age: 66
End: 2021-04-09

## 2021-04-09 VITALS
DIASTOLIC BLOOD PRESSURE: 68 MMHG | WEIGHT: 189 LBS | SYSTOLIC BLOOD PRESSURE: 129 MMHG | HEIGHT: 63 IN | HEART RATE: 69 BPM

## 2021-04-09 DIAGNOSIS — K64.8 OTHER HEMORRHOIDS: ICD-10-CM

## 2021-04-09 DIAGNOSIS — J42 UNSPECIFIED CHRONIC BRONCHITIS: ICD-10-CM

## 2021-04-09 DIAGNOSIS — M06.9 RHEUMATOID ARTHRITIS, UNSPECIFIED: ICD-10-CM

## 2021-04-09 DIAGNOSIS — K21.9 GASTRO-ESOPHAGEAL REFLUX DISEASE WITHOUT ESOPHAGITIS: ICD-10-CM

## 2021-04-09 DIAGNOSIS — R07.89 OTHER CHEST PAIN: ICD-10-CM

## 2021-04-09 DIAGNOSIS — K51.019 ULCERATIVE (CHRONIC) PANCOLITIS WITH UNSPECIFIED COMPLICATIO: ICD-10-CM

## 2021-04-09 DIAGNOSIS — Z86.010 PERSONAL HISTORY OF COLONIC POLYPS: ICD-10-CM

## 2021-04-09 DIAGNOSIS — R13.19 OTHER DYSPHAGIA: ICD-10-CM

## 2021-04-09 DIAGNOSIS — R10.32 LEFT LOWER QUADRANT PAIN: ICD-10-CM

## 2021-04-09 DIAGNOSIS — K57.30 DIVERTICULOSIS OF LARGE INTESTINE WITHOUT PERFORATION OR ABS: ICD-10-CM

## 2021-04-09 DIAGNOSIS — R14.0 ABDOMINAL DISTENSION (GASEOUS): ICD-10-CM

## 2021-04-09 DIAGNOSIS — L40.9 PSORIASIS, UNSPECIFIED: ICD-10-CM

## 2021-04-09 PROCEDURE — 99213 OFFICE O/P EST LOW 20 MIN: CPT | Performed by: INTERNAL MEDICINE

## 2021-04-09 RX ORDER — OMEPRAZOLE 20 MG/1
40 CAPSULE, DELAYED RELEASE ORAL
Qty: 180 | Refills: 11 | Status: ACTIVE
Start: 2020-09-01

## 2021-04-09 RX ORDER — ADALIMUMAB 40MG/0.4ML
KIT SUBCUTANEOUS
Qty: 3 | Refills: 1 | Status: COMPLETED
Start: 2020-03-31 | End: 2022-10-11

## 2021-04-09 RX ORDER — CELECOXIB 100 MG/1
CAPSULE ORAL
Qty: 180 | Refills: 10 | Status: COMPLETED
Start: 2017-12-28 | End: 2022-10-11

## 2021-04-09 RX ORDER — METOPROLOL SUCCINATE 100 MG/1
TABLET, EXTENDED RELEASE ORAL
Qty: 90 | Refills: 5 | Status: COMPLETED
Start: 2020-09-01 | End: 2022-04-20

## 2021-04-09 RX ORDER — TRAZODONE HYDROCHLORIDE 50 MG/1
50 TABLET, FILM COATED ORAL
Qty: 90 | Refills: 2 | Status: ACTIVE
Start: 2020-11-23

## 2021-06-21 ENCOUNTER — ON CAMPUS - OUTPATIENT (AMBULATORY)
Dept: URBAN - METROPOLITAN AREA HOSPITAL 2 | Facility: HOSPITAL | Age: 66
End: 2021-06-21
Payer: COMMERCIAL

## 2021-06-21 VITALS
WEIGHT: 187 LBS | OXYGEN SATURATION: 97 % | SYSTOLIC BLOOD PRESSURE: 135 MMHG | TEMPERATURE: 98 F | DIASTOLIC BLOOD PRESSURE: 83 MMHG | DIASTOLIC BLOOD PRESSURE: 66 MMHG | DIASTOLIC BLOOD PRESSURE: 79 MMHG | HEIGHT: 63 IN | RESPIRATION RATE: 16 BRPM | HEART RATE: 78 BPM | HEART RATE: 87 BPM | RESPIRATION RATE: 18 BRPM | HEART RATE: 72 BPM | OXYGEN SATURATION: 98 % | DIASTOLIC BLOOD PRESSURE: 78 MMHG | SYSTOLIC BLOOD PRESSURE: 148 MMHG | OXYGEN SATURATION: 96 % | SYSTOLIC BLOOD PRESSURE: 167 MMHG | SYSTOLIC BLOOD PRESSURE: 149 MMHG | SYSTOLIC BLOOD PRESSURE: 140 MMHG

## 2021-06-21 DIAGNOSIS — R13.19 OTHER DYSPHAGIA: ICD-10-CM

## 2021-06-21 DIAGNOSIS — R07.89 OTHER CHEST PAIN: ICD-10-CM

## 2021-06-21 DIAGNOSIS — K44.9 DIAPHRAGMATIC HERNIA WITHOUT OBSTRUCTION OR GANGRENE: ICD-10-CM

## 2021-06-21 PROCEDURE — 43450 DILATE ESOPHAGUS 1/MULT PASS: CPT | Performed by: INTERNAL MEDICINE

## 2021-06-21 PROCEDURE — 43235 EGD DIAGNOSTIC BRUSH WASH: CPT | Performed by: INTERNAL MEDICINE

## 2021-06-21 RX ORDER — OMEPRAZOLE 20 MG/1
40 CAPSULE, DELAYED RELEASE ORAL
Qty: 180 | Refills: 11 | Status: ACTIVE
Start: 2020-09-01

## 2022-01-04 PROBLEM — K57.30 DVRTCLOS OF LG INT W/O PERFORATION OR ABSCESS W/O BLEEDING: Status: ACTIVE | Noted: 2017-12-28

## 2022-04-20 ENCOUNTER — OFFICE (AMBULATORY)
Dept: URBAN - METROPOLITAN AREA CLINIC 64 | Facility: CLINIC | Age: 67
End: 2022-04-20

## 2022-04-20 VITALS
SYSTOLIC BLOOD PRESSURE: 137 MMHG | HEART RATE: 90 BPM | HEIGHT: 63 IN | DIASTOLIC BLOOD PRESSURE: 87 MMHG | WEIGHT: 187 LBS

## 2022-04-20 DIAGNOSIS — K50.80 CROHN'S DISEASE OF BOTH SMALL AND LARGE INTESTINE WITHOUT CO: ICD-10-CM

## 2022-04-20 DIAGNOSIS — K21.9 GASTRO-ESOPHAGEAL REFLUX DISEASE WITHOUT ESOPHAGITIS: ICD-10-CM

## 2022-04-20 PROCEDURE — 99214 OFFICE O/P EST MOD 30 MIN: CPT | Performed by: INTERNAL MEDICINE

## 2022-05-31 ENCOUNTER — OFFICE (AMBULATORY)
Dept: URBAN - METROPOLITAN AREA PATHOLOGY 4 | Facility: PATHOLOGY | Age: 67
End: 2022-05-31
Payer: MEDICARE

## 2022-05-31 ENCOUNTER — ON CAMPUS - OUTPATIENT (AMBULATORY)
Dept: URBAN - METROPOLITAN AREA HOSPITAL 2 | Facility: HOSPITAL | Age: 67
End: 2022-05-31
Payer: MEDICARE

## 2022-05-31 VITALS
DIASTOLIC BLOOD PRESSURE: 79 MMHG | SYSTOLIC BLOOD PRESSURE: 161 MMHG | TEMPERATURE: 98.2 F | SYSTOLIC BLOOD PRESSURE: 149 MMHG | HEIGHT: 63 IN | SYSTOLIC BLOOD PRESSURE: 125 MMHG | RESPIRATION RATE: 16 BRPM | DIASTOLIC BLOOD PRESSURE: 78 MMHG | HEART RATE: 64 BPM | HEART RATE: 74 BPM | OXYGEN SATURATION: 99 % | DIASTOLIC BLOOD PRESSURE: 71 MMHG | OXYGEN SATURATION: 97 % | RESPIRATION RATE: 18 BRPM | WEIGHT: 186 LBS | DIASTOLIC BLOOD PRESSURE: 84 MMHG | SYSTOLIC BLOOD PRESSURE: 126 MMHG | HEART RATE: 72 BPM | HEART RATE: 66 BPM | DIASTOLIC BLOOD PRESSURE: 86 MMHG | HEART RATE: 77 BPM | DIASTOLIC BLOOD PRESSURE: 54 MMHG | SYSTOLIC BLOOD PRESSURE: 156 MMHG | HEART RATE: 68 BPM | OXYGEN SATURATION: 98 %

## 2022-05-31 DIAGNOSIS — K29.60 OTHER GASTRITIS WITHOUT BLEEDING: ICD-10-CM

## 2022-05-31 DIAGNOSIS — K44.9 DIAPHRAGMATIC HERNIA WITHOUT OBSTRUCTION OR GANGRENE: ICD-10-CM

## 2022-05-31 DIAGNOSIS — K31.89 OTHER DISEASES OF STOMACH AND DUODENUM: ICD-10-CM

## 2022-05-31 DIAGNOSIS — R13.10 DYSPHAGIA, UNSPECIFIED: ICD-10-CM

## 2022-05-31 LAB
GI HISTOLOGY: A. SELECT: (no result)
GI HISTOLOGY: B. SELECT: (no result)
GI HISTOLOGY: PDF REPORT: (no result)

## 2022-05-31 PROCEDURE — 88305 TISSUE EXAM BY PATHOLOGIST: CPT | Mod: 26 | Performed by: INTERNAL MEDICINE

## 2022-05-31 PROCEDURE — 43450 DILATE ESOPHAGUS 1/MULT PASS: CPT | Performed by: INTERNAL MEDICINE

## 2022-05-31 PROCEDURE — 43239 EGD BIOPSY SINGLE/MULTIPLE: CPT | Performed by: INTERNAL MEDICINE

## 2022-06-03 ENCOUNTER — TRANSCRIBE ORDERS (OUTPATIENT)
Dept: ADMINISTRATIVE | Facility: HOSPITAL | Age: 67
End: 2022-06-03

## 2022-06-03 DIAGNOSIS — R13.10 DYSPHAGIA, UNSPECIFIED TYPE: Primary | ICD-10-CM

## 2022-06-13 ENCOUNTER — HOSPITAL ENCOUNTER (OUTPATIENT)
Dept: GENERAL RADIOLOGY | Facility: HOSPITAL | Age: 67
Discharge: HOME | End: 2022-06-13
Admitting: INTERNAL MEDICINE

## 2022-06-13 DIAGNOSIS — R13.10 DYSPHAGIA, UNSPECIFIED TYPE: ICD-10-CM

## 2022-06-13 PROCEDURE — 74230 X-RAY XM SWLNG FUNCJ C+: CPT

## 2022-06-13 PROCEDURE — 63710000001 BARIUM SULFATE 40 % SUSPENSION: Performed by: INTERNAL MEDICINE

## 2022-06-13 PROCEDURE — A9270 NON-COVERED ITEM OR SERVICE: HCPCS | Performed by: INTERNAL MEDICINE

## 2022-06-13 PROCEDURE — 92611 MOTION FLUOROSCOPY/SWALLOW: CPT

## 2022-06-13 RX ADMIN — BARIUM SULFATE 50 ML: 400 SUSPENSION ORAL at 09:41

## 2022-06-13 NOTE — MBS/VFSS/FEES
"Outpatient Speech Language Pathology   Adult Swallow Initial Evaluation  [facility]     Patient Name: Roland Abbott  : 1955  MRN: [id number]  Today's Date: 2022         Visit Date: 2022   There is no problem list on file for this patient.       Past Medical History:   Diagnosis Date   • Chronic cough    • GERD (gastroesophageal reflux disease)    • Hiatal hernia    • Hyperlipidemia    • Hypertension    • Kidney stones    • Sciatica    • Sleep apnea     CPAP        Past Surgical History:   Procedure Laterality Date   • BRONCHOSCOPY N/A 10/16/2020    Procedure: BRONCHOSCOPY;  Surgeon: Annetta Fritz MD;  Location: [facility] ENDOSCOPY;  Service: Pulmonary;  Laterality: N/A;   • CARDIAC CATHETERIZATION     • JOINT REPLACEMENT Bilateral     knee   • ROTATOR CUFF REPAIR Bilateral          Visit Dx:     ICD-10-CM ICD-9-CM   1. Dysphagia, unspecified type  R13.10 787.20                SLP Adult Swallow Evaluation     Row Name 22 1500       Rehab Evaluation    Document Type evaluation  -SM    Subjective Information no complaints  -SM    Patient Observations alert;cooperative;agree to therapy  -SM    Patient/Family/Caregiver Comments/Observations No family/caregivers present  -SM    Patient Effort excellent  -SM    Comment Video Swallow Study completed this date  -SM    Symptoms Noted During/After Treatment none  -SM       General Information    Patient Profile Reviewed yes  -SM    Pertinent History Of Current Problem The patient is a 66 year old male who presents for a video swallow study due to reports of food sticking and history of esophageal dysphagia. The patient served as his own informant. He reports a history of having a \"scope\" completed approximately 1 time a year for the past several years, with the most recent one completed approximately one month ago. He states he has a globus sensation and points to the sternal area. He does report some alleviation of his symptoms since his most recent " EGD/Dilation, however continues to experience a globus sensation. He was referred for a video swallow study to further investigate his complaints.  -    Current Method of Nutrition regular textures;thin liquids  -    Precautions/Limitations, Vision WFL with corrective lenses;for purposes of eval  -SM    Precautions/Limitations, Hearing WFL;for purposes of eval  -SM    Prior Level of Function-Communication WFL  -SM    Prior Level of Function-Swallowing no diet consistency restrictions;safe, efficient swallowing in all situations;regular textures;thin liquids  -    Plans/Goals Discussed with patient  -    Barriers to Rehab none identified  -       Oral Motor Structure and Function    Dentition Assessment natural, present and adequate  -    Secretion Management WNL/WFL  -SM    Mucosal Quality moist, healthy  -       Oral Musculature and Cranial Nerve Assessment    Oral Motor General Assessment WFL  -    Oral Motor, Comment Lingual protrusion, lateralization and elevation all WFL. Labial protrusion/retraction WFL bilaterally. No overt weakness or asymmetry noted. He is verbal and presents with excellent intelligibility. No hoarse/wet vocal quality audible.  -       General Eating/Swallowing Observations    Respiratory Support Currently in Use room air  -    Eating/Swallowing Skills self-fed  -    Positioning During Eating upright 90 degree  Standing  -       MBS/VFSS    Utensils Used cup;spoon  -    Consistencies Trialed thin liquids;pureed;soft textures;regular textures  -       MBS/VFSS Interpretation    VFSS Summary Video Fluoroscopic Swallow Study conducted in the lateral position with pt standing upright. Pt self fed all trials as provided by SLP respectively including: thin liquid by cup x 2, puree (applesauce) x 2, mechanical soft/mixed consistency (peaches) x 2, regular solid (cracker) x 1, & esophageal scan at the end of the procedure.  Bolus formation, cohesion, & transit are WFL  for all trials and consistencies. Swallow is timely. Upon initiation of the swallow,  laryngeal elevation, epiglottic deflection, and forward hyoid movement are WFL. Laryngeal-vestibular closure is complete. No remarkable cricopharyngeal or UES findings. There is no laryngeal vestibule penetration or aspiration at any time, indicating a 1 on the Rosenbeck Penetration-Aspiration Scale. No significant oral or hypopharyngeal residue noted following any consistency. An esophageal scan was completed at the end of the procedure following a liquid wash. There was noted to be distal esophageal retention with decreased clearance. Mild retrograde flow noted however was below the level of the UES.      IMPRESSIONS: The patient presents with an oropharyngeal swallow which is judged to be within functional limits under fluoroscopy with all trials assessed.     RECOMMENDATIONS: It is recommended that he continue his current diet of regular consistency, thin liquid at this time. He should be up at a full 90 degree angle for all meals/medications, alternate liquids with solids, and remain up for at least 30 minutes following all po. He was encouraged to follow general reflux precautions and is referred back to his GI physician for further work up if his complaints continue or worsen. Results/recommendations of this procedure were discussed in detail following and he expressed understanding. Thank you for referring this pleasant patient to our department and allowing us to participate in his care. Please feel free to contact us at [phone] if you have any other questions.    -       SLP Evaluation Clinical Impression    SLP Swallowing Diagnosis swallow WFL;functional oral phase;functional pharyngeal phase  -SM    Functional Impact no impact on function  -       Recommendations    Therapy Frequency (Swallow) evaluation only  -    SLP Diet Recommendation regular textures;thin liquids  -    Recommended Precautions and  Strategies upright posture during/after eating;small bites of food and sips of liquid;alternate between small bites of food and sips of liquid;general aspiration precautions;reflux precautions;fatigue precautions  -    SLP Rec. for Method of Medication Administration meds whole;with thin liquids;with pudding or applesauce;as tolerated  -          User Key  (r) = Recorded By, (t) = Taken By, (c) = Cosigned By    Initials Name Provider Type    Yoli Baez, SLP Speech and Language Pathologist                  Time Calculation:                     IMER Lovell  6/13/2022

## 2022-07-06 ENCOUNTER — OFFICE (AMBULATORY)
Dept: URBAN - METROPOLITAN AREA CLINIC 64 | Facility: CLINIC | Age: 67
End: 2022-07-06

## 2022-07-06 VITALS
DIASTOLIC BLOOD PRESSURE: 83 MMHG | HEART RATE: 66 BPM | WEIGHT: 184 LBS | SYSTOLIC BLOOD PRESSURE: 142 MMHG | HEIGHT: 63 IN

## 2022-07-06 DIAGNOSIS — K52.9 NONINFECTIVE GASTROENTERITIS AND COLITIS, UNSPECIFIED: ICD-10-CM

## 2022-07-06 PROCEDURE — 99214 OFFICE O/P EST MOD 30 MIN: CPT | Performed by: INTERNAL MEDICINE

## 2022-08-08 ENCOUNTER — OFFICE (AMBULATORY)
Dept: URBAN - METROPOLITAN AREA LAB 2 | Facility: LAB | Age: 67
End: 2022-08-08
Payer: MEDICARE

## 2022-08-08 DIAGNOSIS — K50.918 CROHN'S DISEASE, UNSPECIFIED, WITH OTHER COMPLICATION: ICD-10-CM

## 2022-08-08 DIAGNOSIS — R19.7 DIARRHEA, UNSPECIFIED: ICD-10-CM

## 2022-08-08 PROCEDURE — 87449 NOS EACH ORGANISM AG IA: CPT | Performed by: INTERNAL MEDICINE

## 2022-08-08 PROCEDURE — 87507 IADNA-DNA/RNA PROBE TQ 12-25: CPT | Performed by: INTERNAL MEDICINE

## 2022-08-08 PROCEDURE — 87324 CLOSTRIDIUM AG IA: CPT | Mod: 59 | Performed by: INTERNAL MEDICINE

## 2022-10-11 ENCOUNTER — OFFICE (AMBULATORY)
Dept: URBAN - METROPOLITAN AREA CLINIC 64 | Facility: CLINIC | Age: 67
End: 2022-10-11

## 2022-10-11 VITALS
WEIGHT: 176 LBS | DIASTOLIC BLOOD PRESSURE: 100 MMHG | HEART RATE: 65 BPM | HEIGHT: 63 IN | SYSTOLIC BLOOD PRESSURE: 166 MMHG

## 2022-10-11 DIAGNOSIS — K52.9 NONINFECTIVE GASTROENTERITIS AND COLITIS, UNSPECIFIED: ICD-10-CM

## 2022-10-11 PROCEDURE — 99213 OFFICE O/P EST LOW 20 MIN: CPT | Performed by: INTERNAL MEDICINE

## 2023-04-27 ENCOUNTER — OFFICE (AMBULATORY)
Dept: URBAN - METROPOLITAN AREA CLINIC 64 | Facility: CLINIC | Age: 68
End: 2023-04-27
Payer: OTHER GOVERNMENT

## 2023-04-27 VITALS
HEART RATE: 57 BPM | HEIGHT: 63 IN | WEIGHT: 179 LBS | DIASTOLIC BLOOD PRESSURE: 97 MMHG | SYSTOLIC BLOOD PRESSURE: 158 MMHG

## 2023-04-27 DIAGNOSIS — R15.0 INCOMPLETE DEFECATION: ICD-10-CM

## 2023-04-27 DIAGNOSIS — R13.10 DYSPHAGIA, UNSPECIFIED: ICD-10-CM

## 2023-04-27 DIAGNOSIS — R19.7 DIARRHEA, UNSPECIFIED: ICD-10-CM

## 2023-04-27 DIAGNOSIS — Z92.25 PERSONAL HISTORY OF IMMUNOSUPPRESSION THERAPY: ICD-10-CM

## 2023-04-27 DIAGNOSIS — Z87.891 PERSONAL HISTORY OF NICOTINE DEPENDENCE: ICD-10-CM

## 2023-04-27 PROCEDURE — 99214 OFFICE O/P EST MOD 30 MIN: CPT | Performed by: INTERNAL MEDICINE

## 2023-05-16 ENCOUNTER — OFFICE (AMBULATORY)
Dept: URBAN - METROPOLITAN AREA PATHOLOGY 4 | Facility: PATHOLOGY | Age: 68
End: 2023-05-16
Payer: OTHER GOVERNMENT

## 2023-05-16 ENCOUNTER — ON CAMPUS - OUTPATIENT (AMBULATORY)
Dept: URBAN - METROPOLITAN AREA HOSPITAL 2 | Facility: HOSPITAL | Age: 68
End: 2023-05-16
Payer: OTHER GOVERNMENT

## 2023-05-16 VITALS
HEART RATE: 58 BPM | WEIGHT: 179 LBS | HEART RATE: 60 BPM | RESPIRATION RATE: 20 BRPM | SYSTOLIC BLOOD PRESSURE: 137 MMHG | DIASTOLIC BLOOD PRESSURE: 86 MMHG | RESPIRATION RATE: 21 BRPM | SYSTOLIC BLOOD PRESSURE: 152 MMHG | HEART RATE: 56 BPM | DIASTOLIC BLOOD PRESSURE: 80 MMHG | RESPIRATION RATE: 23 BRPM | DIASTOLIC BLOOD PRESSURE: 98 MMHG | SYSTOLIC BLOOD PRESSURE: 122 MMHG | OXYGEN SATURATION: 98 % | SYSTOLIC BLOOD PRESSURE: 128 MMHG | TEMPERATURE: 97.7 F | DIASTOLIC BLOOD PRESSURE: 77 MMHG | RESPIRATION RATE: 17 BRPM | HEART RATE: 62 BPM | DIASTOLIC BLOOD PRESSURE: 88 MMHG | HEIGHT: 63 IN | OXYGEN SATURATION: 97 % | RESPIRATION RATE: 16 BRPM | DIASTOLIC BLOOD PRESSURE: 83 MMHG | SYSTOLIC BLOOD PRESSURE: 150 MMHG | SYSTOLIC BLOOD PRESSURE: 170 MMHG | HEART RATE: 59 BPM | OXYGEN SATURATION: 99 %

## 2023-05-16 DIAGNOSIS — K29.70 GASTRITIS, UNSPECIFIED, WITHOUT BLEEDING: ICD-10-CM

## 2023-05-16 DIAGNOSIS — R13.10 DYSPHAGIA, UNSPECIFIED: ICD-10-CM

## 2023-05-16 DIAGNOSIS — K31.89 OTHER DISEASES OF STOMACH AND DUODENUM: ICD-10-CM

## 2023-05-16 LAB
GI HISTOLOGY: A. SELECT: (no result)
GI HISTOLOGY: PDF REPORT: (no result)

## 2023-05-16 PROCEDURE — 43239 EGD BIOPSY SINGLE/MULTIPLE: CPT | Performed by: INTERNAL MEDICINE

## 2023-05-16 PROCEDURE — 43450 DILATE ESOPHAGUS 1/MULT PASS: CPT | Performed by: INTERNAL MEDICINE

## 2023-05-16 PROCEDURE — 88305 TISSUE EXAM BY PATHOLOGIST: CPT | Performed by: INTERNAL MEDICINE

## 2023-09-25 ENCOUNTER — OFFICE (AMBULATORY)
Dept: URBAN - METROPOLITAN AREA CLINIC 64 | Facility: CLINIC | Age: 68
End: 2023-09-25
Payer: OTHER GOVERNMENT

## 2023-09-25 VITALS
SYSTOLIC BLOOD PRESSURE: 140 MMHG | HEIGHT: 63 IN | HEART RATE: 58 BPM | DIASTOLIC BLOOD PRESSURE: 82 MMHG | WEIGHT: 173 LBS

## 2023-09-25 DIAGNOSIS — Z91.018 ALLERGY TO OTHER FOODS: ICD-10-CM

## 2023-09-25 DIAGNOSIS — K21.9 GASTRO-ESOPHAGEAL REFLUX DISEASE WITHOUT ESOPHAGITIS: ICD-10-CM

## 2023-09-25 DIAGNOSIS — R13.10 DYSPHAGIA, UNSPECIFIED: ICD-10-CM

## 2023-09-25 DIAGNOSIS — R19.7 DIARRHEA, UNSPECIFIED: ICD-10-CM

## 2023-09-25 PROCEDURE — 99213 OFFICE O/P EST LOW 20 MIN: CPT | Performed by: INTERNAL MEDICINE

## 2024-07-11 ENCOUNTER — OFFICE (AMBULATORY)
Dept: URBAN - METROPOLITAN AREA CLINIC 64 | Facility: CLINIC | Age: 69
End: 2024-07-11
Payer: OTHER GOVERNMENT

## 2024-07-11 VITALS
HEIGHT: 63 IN | DIASTOLIC BLOOD PRESSURE: 75 MMHG | WEIGHT: 169 LBS | HEART RATE: 56 BPM | SYSTOLIC BLOOD PRESSURE: 152 MMHG

## 2024-07-11 DIAGNOSIS — R13.10 DYSPHAGIA, UNSPECIFIED: ICD-10-CM

## 2024-07-11 DIAGNOSIS — R19.4 CHANGE IN BOWEL HABIT: ICD-10-CM

## 2024-07-11 DIAGNOSIS — Z91.018 ALLERGY TO OTHER FOODS: ICD-10-CM

## 2024-07-11 DIAGNOSIS — K21.9 GASTRO-ESOPHAGEAL REFLUX DISEASE WITHOUT ESOPHAGITIS: ICD-10-CM

## 2024-07-11 PROCEDURE — 99214 OFFICE O/P EST MOD 30 MIN: CPT | Performed by: INTERNAL MEDICINE

## 2024-07-11 RX ORDER — OMEPRAZOLE 40 MG/1
40 CAPSULE, DELAYED RELEASE ORAL
Qty: 90 | Refills: 3 | Status: ACTIVE
Start: 2024-07-11

## 2024-10-09 ENCOUNTER — ON CAMPUS - OUTPATIENT (AMBULATORY)
Age: 69
End: 2024-10-09
Payer: OTHER GOVERNMENT

## 2024-10-09 ENCOUNTER — HOSPITAL ENCOUNTER (OUTPATIENT)
Facility: HOSPITAL | Age: 69
Setting detail: HOSPITAL OUTPATIENT SURGERY
Discharge: HOME | End: 2024-10-09
Attending: INTERNAL MEDICINE | Admitting: INTERNAL MEDICINE
Payer: OTHER GOVERNMENT

## 2024-10-09 ENCOUNTER — ANESTHESIA (OUTPATIENT)
Dept: GASTROENTEROLOGY | Facility: HOSPITAL | Age: 69
End: 2024-10-09
Payer: OTHER GOVERNMENT

## 2024-10-09 ENCOUNTER — ON CAMPUS - OUTPATIENT (AMBULATORY)
Dept: URBAN - METROPOLITAN AREA HOSPITAL 85 | Facility: HOSPITAL | Age: 69
End: 2024-10-09
Payer: OTHER GOVERNMENT

## 2024-10-09 ENCOUNTER — ANESTHESIA EVENT (OUTPATIENT)
Dept: GASTROENTEROLOGY | Facility: HOSPITAL | Age: 69
End: 2024-10-09
Payer: OTHER GOVERNMENT

## 2024-10-09 VITALS
HEART RATE: 57 BPM | DIASTOLIC BLOOD PRESSURE: 63 MMHG | TEMPERATURE: 98 F | HEIGHT: 63 IN | OXYGEN SATURATION: 98 % | RESPIRATION RATE: 18 BRPM | SYSTOLIC BLOOD PRESSURE: 124 MMHG | BODY MASS INDEX: 29.88 KG/M2 | WEIGHT: 168.6 LBS

## 2024-10-09 DIAGNOSIS — K29.50 UNSPECIFIED CHRONIC GASTRITIS WITHOUT BLEEDING: ICD-10-CM

## 2024-10-09 DIAGNOSIS — R13.10 DYSPHAGIA, UNSPECIFIED: ICD-10-CM

## 2024-10-09 DIAGNOSIS — R13.10 DYSPHAGIA: ICD-10-CM

## 2024-10-09 DIAGNOSIS — K21.9 GERD (GASTROESOPHAGEAL REFLUX DISEASE): ICD-10-CM

## 2024-10-09 DIAGNOSIS — K21.9 GASTRO-ESOPHAGEAL REFLUX DISEASE WITHOUT ESOPHAGITIS: ICD-10-CM

## 2024-10-09 PROCEDURE — 43450 DILATE ESOPHAGUS 1/MULT PASS: CPT | Performed by: INTERNAL MEDICINE

## 2024-10-09 PROCEDURE — 25010000002 LIDOCAINE 2% SOLUTION: Performed by: NURSE ANESTHETIST, CERTIFIED REGISTERED

## 2024-10-09 PROCEDURE — 43239 EGD BIOPSY SINGLE/MULTIPLE: CPT | Performed by: INTERNAL MEDICINE

## 2024-10-09 PROCEDURE — 88305 TISSUE EXAM BY PATHOLOGIST: CPT | Performed by: INTERNAL MEDICINE

## 2024-10-09 PROCEDURE — 25010000002 PROPOFOL 1000 MG/100ML EMULSION: Performed by: NURSE ANESTHETIST, CERTIFIED REGISTERED

## 2024-10-09 PROCEDURE — 25810000003 SODIUM CHLORIDE 0.9 % SOLUTION: Performed by: NURSE ANESTHETIST, CERTIFIED REGISTERED

## 2024-10-09 RX ORDER — HYDRALAZINE HYDROCHLORIDE 20 MG/ML
5 INJECTION INTRAMUSCULAR; INTRAVENOUS
Status: DISCONTINUED | OUTPATIENT
Start: 2024-10-09 | End: 2024-10-09 | Stop reason: HOSPADM

## 2024-10-09 RX ORDER — SODIUM CHLORIDE 9 MG/ML
30 INJECTION, SOLUTION INTRAVENOUS CONTINUOUS PRN
Status: DISCONTINUED | OUTPATIENT
Start: 2024-10-09 | End: 2024-10-09 | Stop reason: HOSPADM

## 2024-10-09 RX ORDER — EPHEDRINE SULFATE 5 MG/ML
5 INJECTION INTRAVENOUS ONCE AS NEEDED
Status: DISCONTINUED | OUTPATIENT
Start: 2024-10-09 | End: 2024-10-09 | Stop reason: HOSPADM

## 2024-10-09 RX ORDER — ONDANSETRON 2 MG/ML
4 INJECTION INTRAMUSCULAR; INTRAVENOUS ONCE AS NEEDED
Status: DISCONTINUED | OUTPATIENT
Start: 2024-10-09 | End: 2024-10-09 | Stop reason: HOSPADM

## 2024-10-09 RX ORDER — LIDOCAINE HYDROCHLORIDE 20 MG/ML
INJECTION, SOLUTION INFILTRATION; PERINEURAL AS NEEDED
Status: DISCONTINUED | OUTPATIENT
Start: 2024-10-09 | End: 2024-10-09 | Stop reason: SURG

## 2024-10-09 RX ORDER — LABETALOL HYDROCHLORIDE 5 MG/ML
5 INJECTION, SOLUTION INTRAVENOUS
Status: DISCONTINUED | OUTPATIENT
Start: 2024-10-09 | End: 2024-10-09 | Stop reason: HOSPADM

## 2024-10-09 RX ORDER — SODIUM CHLORIDE 9 MG/ML
INJECTION, SOLUTION INTRAVENOUS CONTINUOUS PRN
Status: DISCONTINUED | OUTPATIENT
Start: 2024-10-09 | End: 2024-10-09 | Stop reason: SURG

## 2024-10-09 RX ORDER — IPRATROPIUM BROMIDE AND ALBUTEROL SULFATE 2.5; .5 MG/3ML; MG/3ML
3 SOLUTION RESPIRATORY (INHALATION) ONCE AS NEEDED
Status: DISCONTINUED | OUTPATIENT
Start: 2024-10-09 | End: 2024-10-09 | Stop reason: HOSPADM

## 2024-10-09 RX ORDER — PROPOFOL 10 MG/ML
INJECTION, EMULSION INTRAVENOUS AS NEEDED
Status: DISCONTINUED | OUTPATIENT
Start: 2024-10-09 | End: 2024-10-09 | Stop reason: SURG

## 2024-10-09 RX ORDER — SODIUM CHLORIDE 9 MG/ML
30 INJECTION, SOLUTION INTRAVENOUS CONTINUOUS
Status: DISCONTINUED | OUTPATIENT
Start: 2024-10-09 | End: 2024-10-09 | Stop reason: HOSPADM

## 2024-10-09 RX ORDER — DIPHENHYDRAMINE HYDROCHLORIDE 50 MG/ML
12.5 INJECTION INTRAMUSCULAR; INTRAVENOUS
Status: DISCONTINUED | OUTPATIENT
Start: 2024-10-09 | End: 2024-10-09 | Stop reason: HOSPADM

## 2024-10-09 RX ORDER — MEPERIDINE HYDROCHLORIDE 25 MG/ML
12.5 INJECTION INTRAMUSCULAR; INTRAVENOUS; SUBCUTANEOUS
Status: DISCONTINUED | OUTPATIENT
Start: 2024-10-09 | End: 2024-10-09 | Stop reason: HOSPADM

## 2024-10-09 RX ADMIN — SODIUM CHLORIDE: 9 INJECTION, SOLUTION INTRAVENOUS at 09:13

## 2024-10-09 RX ADMIN — PROPOFOL INJECTABLE EMULSION 50 MG: 10 INJECTION, EMULSION INTRAVENOUS at 09:23

## 2024-10-09 RX ADMIN — PROPOFOL INJECTABLE EMULSION 50 MG: 10 INJECTION, EMULSION INTRAVENOUS at 09:22

## 2024-10-09 RX ADMIN — PROPOFOL INJECTABLE EMULSION 50 MG: 10 INJECTION, EMULSION INTRAVENOUS at 09:20

## 2024-10-09 RX ADMIN — PROPOFOL INJECTABLE EMULSION 100 MG: 10 INJECTION, EMULSION INTRAVENOUS at 09:19

## 2024-10-09 RX ADMIN — PROPOFOL INJECTABLE EMULSION 50 MG: 10 INJECTION, EMULSION INTRAVENOUS at 09:25

## 2024-10-09 RX ADMIN — LIDOCAINE HYDROCHLORIDE 80 MG: 20 INJECTION, SOLUTION INFILTRATION; PERINEURAL at 09:19

## 2024-10-09 NOTE — H&P
" LOS: 0 days   Patient Care Team:  Farzad Elizabeth MD as PCP - General      Subjective     Interval History:     Subjective: Dysphagia      ROS:   No chest pain, shortness of breath, or cough.        Medication Review:     Current Facility-Administered Medications:     atropine sulfate injection 0.5 mg, 0.5 mg, Intravenous, Once PRN, Shilpi Vallejo CRNA    diphenhydrAMINE (BENADRYL) injection 12.5 mg, 12.5 mg, Intravenous, Q15 Min PRN, Shilpi Vallejo CRNA    ePHEDrine Sulfate (Pressors) 5 MG/ML injection 5 mg, 5 mg, Intravenous, Once PRN, Shilpi Vallejo CRNA    hydrALAZINE (APRESOLINE) injection 5 mg, 5 mg, Intravenous, Q10 Min PRN, Shilpi Vallejo CRNA    ipratropium-albuterol (DUO-NEB) nebulizer solution 3 mL, 3 mL, Nebulization, Once PRN, Shilpi Vallejo CRNA    labetalol (NORMODYNE,TRANDATE) injection 5 mg, 5 mg, Intravenous, Q5 Min PRN, Shilpi Vallejo CRNA    lidocaine (cardiac) (XYLOCAINE) injection 100 mg, 100 mg, Intravenous, Q5 Min PRN, Shilpi Vallejo CRNA    meperidine (DEMEROL) injection 12.5 mg, 12.5 mg, Intravenous, Q5 Min PRN, Shilpi Vallejo CRNA    ondansetron (ZOFRAN) injection 4 mg, 4 mg, Intravenous, Once PRN, Shilpi Vallejo CRNA    sodium chloride 0.9 % infusion, 30 mL/hr, Intravenous, Continuous, Selvin Gant MD      Objective     Vital Signs  Vitals:    09/25/24 1213 10/09/24 0833   BP:  158/71   BP Location:  Left arm   Patient Position:  Lying   Pulse:  58   Resp:  17   Temp:  98 °F (36.7 °C)   TempSrc:  Oral   SpO2:  97%   Weight: 83.9 kg (185 lb) 76.5 kg (168 lb 9.6 oz)   Height: 160 cm (63\") 160 cm (63\")       Physical Exam:    General Appearance:    Awake and alert, in no acute distress   Head:    Normocephalic, without obvious abnormality   Eyes:          Conjunctivae normal, anicteric sclerae   Throat:   No oral lesions, no thrush, oral mucosa moist   Neck:   No adenopathy, supple, no JVD   Lungs:     respirations regular, even and unlabored "   Abdomen:     Soft, non-vu, no rebound or guarding, nondistended, no hepatosplenomegaly   Rectal:     Deferred   Extremities:   No edema, no cyanosis   Skin:   No bruising or rash, no jaundice        Results Review:    Lab Results (last 24 hours)       ** No results found for the last 24 hours. **            Imaging Results (Last 24 Hours)       ** No results found for the last 24 hours. **              Assessment & Plan   Proceed with scope and MAC anesthesia        Concepción Clements MD  10/09/24  09:16 EDT

## 2024-10-09 NOTE — DISCHARGE INSTRUCTIONS
A responsible adult should stay with you and you should rest quietly for the rest of the day.    Do not drink alcohol, drive, operate any heavy machinery or power tools or make any legal/important decisions for the next 24 hours.     Progress your diet as tolerated.  If you begin to experience severe pain, increased shortness of breath, racing heartbeat or a fever above 101 F, seek immediate medical attention.     Follow up with MD as instructed. Call office for results in 3 to 5 days if needed.   [phone]    Impression:  Normal esophagus with previous esophageal biopsies negative for eosinophilic esophagitis  History of dysphagia  Nonerosive gastritis     Recommendations:  Follow-up histopathology  Repeat EGD as needed for recurrent dysphagia  Consider outpatient esophageal manometry

## 2024-10-09 NOTE — OP NOTE
ESOPHAGOGASTRODUODENOSCOPY Procedure Report    Patient Name:  Roland Abbott  YOB: 1955    Date of Surgery:  10/9/2024     Pre-Op Diagnosis:  Dysphagia [R13.10]  GERD (gastroesophageal reflux disease) [K21.9]       Post Op Diagnosis:  Nonerosive gastritis      Procedure/CPT® Codes:      Procedure(s):  ESOPHAGOGASTRODUODENOSCOPY WITH BIOPSY X 1, DILATIOM (#60 BOUGIE)    Staff:  Surgeon(s):  Concepción Clements MD         Anesthesia: Monitored Anesthesia Care    Implants:    Nothing was implanted during the procedure    Specimen:        See Below    No blood loss    Complications:  None     Description of Procedure:  Informed consent was obtained for the procedure, including sedation.  Risks of perforation, hemorrhage, adverse drug reaction and aspiration were discussed.  The patient was brought into the endoscopy suite. Continuous cardiopulmonary monitoring was performed. The patient was placed in the left lateral decubitus position.  The bite block was inserted into the patient's mouth. After adequate sedation was attained, the Olympus gastroscope was inserted into the patient's mouth and advanced to the second portion of the duodenum without difficulty.  Circumferential examination was performed. A retroflex exam was performed in the patient's stomach.  On completion of the exam, the bowel was decompressed, the scope was removed from the patient, the patient tolerated the procedure well, there were no immediate post-operative complications.     Examination of the esophagus showed normal mucosa.  60 Persian Reyna dilation was performed.  There was no mucosal tearing on repeat endoscopy  Examination of the stomach showed nonerosive gastritis.  Biopsies were obtained from the gastric antrum and body and sent for pathology to evaluate for H. pylori.  Retroflex examination of the stomach was normal   Examination of the duodenum showed normal mucosa      Impression:  Normal esophagus with previous  esophageal biopsies negative for eosinophilic esophagitis  History of dysphagia  Nonerosive gastritis    Recommendations:  Follow-up histopathology  Repeat EGD as needed for recurrent dysphagia  Consider outpatient esophageal manometry      Concepción Clements MD     Date: 10/9/2024  Time: 09:33 EDT

## 2024-10-10 LAB
LAB AP CASE REPORT: NORMAL
PATH REPORT.FINAL DX SPEC: NORMAL
PATH REPORT.GROSS SPEC: NORMAL

## 2025-04-09 ENCOUNTER — OFFICE (AMBULATORY)
Dept: URBAN - METROPOLITAN AREA CLINIC 64 | Facility: CLINIC | Age: 70
End: 2025-04-09
Payer: OTHER GOVERNMENT

## 2025-04-09 VITALS
WEIGHT: 161 LBS | SYSTOLIC BLOOD PRESSURE: 148 MMHG | SYSTOLIC BLOOD PRESSURE: 144 MMHG | HEART RATE: 63 BPM | DIASTOLIC BLOOD PRESSURE: 79 MMHG | HEIGHT: 63 IN | DIASTOLIC BLOOD PRESSURE: 73 MMHG

## 2025-04-09 DIAGNOSIS — Z87.891 PERSONAL HISTORY OF NICOTINE DEPENDENCE: ICD-10-CM

## 2025-04-09 DIAGNOSIS — R07.0 PAIN IN THROAT: ICD-10-CM

## 2025-04-09 DIAGNOSIS — K20.0 EOSINOPHILIC ESOPHAGITIS: ICD-10-CM

## 2025-04-09 DIAGNOSIS — Z86.0100 PERSONAL HISTORY OF COLON POLYPS, UNSPECIFIED: ICD-10-CM

## 2025-04-09 DIAGNOSIS — Z80.0 FAMILY HISTORY OF MALIGNANT NEOPLASM OF DIGESTIVE ORGANS: ICD-10-CM

## 2025-04-09 DIAGNOSIS — Z91.018 ALLERGY TO OTHER FOODS: ICD-10-CM

## 2025-04-09 PROCEDURE — 99214 OFFICE O/P EST MOD 30 MIN: CPT | Performed by: INTERNAL MEDICINE

## 2025-08-12 ENCOUNTER — OFFICE (AMBULATORY)
Dept: URBAN - METROPOLITAN AREA PATHOLOGY 19 | Facility: PATHOLOGY | Age: 70
End: 2025-08-12
Payer: OTHER GOVERNMENT

## 2025-08-12 ENCOUNTER — ON CAMPUS - OUTPATIENT (AMBULATORY)
Dept: URBAN - METROPOLITAN AREA HOSPITAL 2 | Facility: HOSPITAL | Age: 70
End: 2025-08-12
Payer: OTHER GOVERNMENT

## 2025-08-12 VITALS
OXYGEN SATURATION: 98 % | SYSTOLIC BLOOD PRESSURE: 132 MMHG | RESPIRATION RATE: 18 BRPM | HEART RATE: 77 BPM | TEMPERATURE: 97.7 F | WEIGHT: 152 LBS | DIASTOLIC BLOOD PRESSURE: 78 MMHG | OXYGEN SATURATION: 96 % | SYSTOLIC BLOOD PRESSURE: 190 MMHG | HEART RATE: 53 BPM | HEART RATE: 69 BPM | SYSTOLIC BLOOD PRESSURE: 146 MMHG | DIASTOLIC BLOOD PRESSURE: 74 MMHG | HEART RATE: 58 BPM | SYSTOLIC BLOOD PRESSURE: 168 MMHG | DIASTOLIC BLOOD PRESSURE: 75 MMHG | HEART RATE: 71 BPM | DIASTOLIC BLOOD PRESSURE: 80 MMHG | SYSTOLIC BLOOD PRESSURE: 154 MMHG | HEART RATE: 61 BPM | HEART RATE: 62 BPM | HEIGHT: 63 IN | DIASTOLIC BLOOD PRESSURE: 87 MMHG | DIASTOLIC BLOOD PRESSURE: 89 MMHG | DIASTOLIC BLOOD PRESSURE: 73 MMHG | RESPIRATION RATE: 17 BRPM | SYSTOLIC BLOOD PRESSURE: 144 MMHG | OXYGEN SATURATION: 99 % | SYSTOLIC BLOOD PRESSURE: 162 MMHG | HEART RATE: 59 BPM | DIASTOLIC BLOOD PRESSURE: 77 MMHG | RESPIRATION RATE: 16 BRPM | SYSTOLIC BLOOD PRESSURE: 129 MMHG | OXYGEN SATURATION: 100 %

## 2025-08-12 DIAGNOSIS — K57.30 DIVERTICULOSIS OF LARGE INTESTINE WITHOUT PERFORATION OR ABS: ICD-10-CM

## 2025-08-12 DIAGNOSIS — K63.5 POLYP OF COLON: ICD-10-CM

## 2025-08-12 DIAGNOSIS — D12.3 BENIGN NEOPLASM OF TRANSVERSE COLON: ICD-10-CM

## 2025-08-12 DIAGNOSIS — Z09 ENCOUNTER FOR FOLLOW-UP EXAMINATION AFTER COMPLETED TREATMEN: ICD-10-CM

## 2025-08-12 DIAGNOSIS — Z86.0102 PERSONAL HISTORY OF HYPERPLASTIC COLON POLYPS: ICD-10-CM

## 2025-08-12 DIAGNOSIS — K62.1 RECTAL POLYP: ICD-10-CM

## 2025-08-12 PROCEDURE — 45385 COLONOSCOPY W/LESION REMOVAL: CPT | Mod: 33 | Performed by: INTERNAL MEDICINE

## 2025-08-12 PROCEDURE — 88305 TISSUE EXAM BY PATHOLOGIST: CPT | Performed by: PATHOLOGY

## 2025-08-12 RX ADMIN — ONDANSETRON HYDROCHLORIDE 4 MG: 4 SOLUTION ORAL at 07:57

## (undated) DEVICE — BAPTIST FLOYD BRONCHOSCOPY: Brand: MEDLINE INDUSTRIES, INC.

## (undated) DEVICE — SINGLE-USE BIOPSY FORCEPS: Brand: RADIAL JAW 4

## (undated) DEVICE — PK ENDO GI 50

## (undated) DEVICE — BITEBLOCK ENDO W/STRAP 60F A/ LF DISP